# Patient Record
Sex: FEMALE | Race: WHITE | Employment: OTHER | URBAN - METROPOLITAN AREA
[De-identification: names, ages, dates, MRNs, and addresses within clinical notes are randomized per-mention and may not be internally consistent; named-entity substitution may affect disease eponyms.]

---

## 2023-06-21 ENCOUNTER — HOSPITAL ENCOUNTER (INPATIENT)
Age: 68
LOS: 1 days | Discharge: HOME | DRG: 087 | End: 2023-06-22
Attending: STUDENT IN AN ORGANIZED HEALTH CARE EDUCATION/TRAINING PROGRAM | Admitting: STUDENT IN AN ORGANIZED HEALTH CARE EDUCATION/TRAINING PROGRAM
Payer: MEDICARE

## 2023-06-21 ENCOUNTER — APPOINTMENT (OUTPATIENT)
Dept: GENERAL RADIOLOGY | Age: 68
DRG: 087 | End: 2023-06-21
Payer: MEDICARE

## 2023-06-21 ENCOUNTER — APPOINTMENT (OUTPATIENT)
Dept: CT IMAGING | Age: 68
DRG: 087 | End: 2023-06-21
Payer: MEDICARE

## 2023-06-21 DIAGNOSIS — S09.90XA INJURY OF HEAD, INITIAL ENCOUNTER: ICD-10-CM
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
## 2023-06-21 DIAGNOSIS — S62.92XA CLOSED FRACTURE OF LEFT HAND, INITIAL ENCOUNTER: ICD-10-CM

## 2023-06-21 DIAGNOSIS — G93.9 BRAIN LESION: Primary | ICD-10-CM

## 2023-06-21 PROBLEM — S06.5XAA SUBDURAL HEMATOMA, ACUTE (HCC): Status: ACTIVE | Noted: 2023-06-21

## 2023-06-21 PROBLEM — I60.9 SUBARACHNOID BLEED (HCC): Status: ACTIVE | Noted: 2023-06-21

## 2023-06-21 LAB
ALBUMIN SERPL-MCNC: 4.3 G/DL (ref 3.5–4.6)
ALP SERPL-CCNC: 95 U/L (ref 40–130)
ALT SERPL-CCNC: 17 U/L (ref 0–33)
ANION GAP SERPL CALCULATED.3IONS-SCNC: 14 MEQ/L (ref 9–15)
APTT PPP: 27.9 SEC (ref 24.4–36.8)
AST SERPL-CCNC: 22 U/L (ref 0–35)
BASOPHILS # BLD: 0.1 K/UL (ref 0–0.2)
BASOPHILS NFR BLD: 0.9 %
BILIRUB SERPL-MCNC: 0.3 MG/DL (ref 0.2–0.7)
BUN SERPL-MCNC: 21 MG/DL (ref 8–23)
CALCIUM SERPL-MCNC: 9.3 MG/DL (ref 8.5–9.9)
CHLORIDE SERPL-SCNC: 101 MEQ/L (ref 95–107)
CO2 SERPL-SCNC: 28 MEQ/L (ref 20–31)
CREAT SERPL-MCNC: 1.01 MG/DL (ref 0.5–0.9)
EOSINOPHIL # BLD: 0.1 K/UL (ref 0–0.7)
EOSINOPHIL NFR BLD: 1.8 %
ERYTHROCYTE [DISTWIDTH] IN BLOOD BY AUTOMATED COUNT: 14.8 % (ref 11.5–14.5)
ETHANOL PERCENT: NORMAL G/DL
ETHANOLAMINE SERPL-MCNC: <10 MG/DL (ref 0–0.08)
GLOBULIN SER CALC-MCNC: 2.4 G/DL (ref 2.3–3.5)
GLUCOSE SERPL-MCNC: 111 MG/DL (ref 70–99)
HCT VFR BLD AUTO: 41.4 % (ref 37–47)
HGB BLD-MCNC: 13.9 G/DL (ref 12–16)
INR PPP: 1
LYMPHOCYTES # BLD: 2 K/UL (ref 1–4.8)
LYMPHOCYTES NFR BLD: 25.5 %
MCH RBC QN AUTO: 29.4 PG (ref 27–31.3)
MCHC RBC AUTO-ENTMCNC: 33.7 % (ref 33–37)
MCV RBC AUTO: 87.4 FL (ref 79.4–94.8)
MONOCYTES # BLD: 0.5 K/UL (ref 0.2–0.8)
MONOCYTES NFR BLD: 5.9 %
NEUTROPHILS # BLD: 5.1 K/UL (ref 1.4–6.5)
NEUTS SEG NFR BLD: 65.9 %
PLATELET # BLD AUTO: 269 K/UL (ref 130–400)
POTASSIUM SERPL-SCNC: 3.6 MEQ/L (ref 3.4–4.9)
PROT SERPL-MCNC: 6.7 G/DL (ref 6.3–8)
PROTHROMBIN TIME: 13.3 SEC (ref 12.3–14.9)
RBC # BLD AUTO: 4.73 M/UL (ref 4.2–5.4)
SODIUM SERPL-SCNC: 143 MEQ/L (ref 135–144)
WBC # BLD AUTO: 7.7 K/UL (ref 4.8–10.8)

## 2023-06-21 PROCEDURE — 6360000002 HC RX W HCPCS: Performed by: PHYSICIAN ASSISTANT

## 2023-06-21 PROCEDURE — 99285 EMERGENCY DEPT VISIT HI MDM: CPT

## 2023-06-21 PROCEDURE — 85730 THROMBOPLASTIN TIME PARTIAL: CPT

## 2023-06-21 PROCEDURE — 1210000000 HC MED SURG R&B

## 2023-06-21 PROCEDURE — 99222 1ST HOSP IP/OBS MODERATE 55: CPT | Performed by: NEUROLOGICAL SURGERY

## 2023-06-21 PROCEDURE — 6370000000 HC RX 637 (ALT 250 FOR IP): Performed by: STUDENT IN AN ORGANIZED HEALTH CARE EDUCATION/TRAINING PROGRAM

## 2023-06-21 PROCEDURE — 36415 COLL VENOUS BLD VENIPUNCTURE: CPT

## 2023-06-21 PROCEDURE — 72125 CT NECK SPINE W/O DYE: CPT

## 2023-06-21 PROCEDURE — 85610 PROTHROMBIN TIME: CPT

## 2023-06-21 PROCEDURE — 2580000003 HC RX 258: Performed by: STUDENT IN AN ORGANIZED HEALTH CARE EDUCATION/TRAINING PROGRAM

## 2023-06-21 PROCEDURE — 73130 X-RAY EXAM OF HAND: CPT

## 2023-06-21 PROCEDURE — 70450 CT HEAD/BRAIN W/O DYE: CPT

## 2023-06-21 PROCEDURE — 85025 COMPLETE CBC W/AUTO DIFF WBC: CPT

## 2023-06-21 PROCEDURE — 82077 ASSAY SPEC XCP UR&BREATH IA: CPT

## 2023-06-21 PROCEDURE — 80053 COMPREHEN METABOLIC PANEL: CPT

## 2023-06-21 RX ORDER — SODIUM CHLORIDE 9 MG/ML
INJECTION, SOLUTION INTRAVENOUS PRN
Status: DISCONTINUED | OUTPATIENT
Start: 2023-06-21 | End: 2023-06-22 | Stop reason: HOSPADM

## 2023-06-21 RX ORDER — MORPHINE SULFATE 4 MG/ML
4 INJECTION, SOLUTION INTRAMUSCULAR; INTRAVENOUS ONCE
Status: COMPLETED | OUTPATIENT
Start: 2023-06-21 | End: 2023-06-21

## 2023-06-21 RX ORDER — ONDANSETRON 4 MG/1
4 TABLET, ORALLY DISINTEGRATING ORAL EVERY 8 HOURS PRN
Status: DISCONTINUED | OUTPATIENT
Start: 2023-06-21 | End: 2023-06-22 | Stop reason: HOSPADM

## 2023-06-21 RX ORDER — SODIUM CHLORIDE 0.9 % (FLUSH) 0.9 %
5-40 SYRINGE (ML) INJECTION EVERY 12 HOURS SCHEDULED
Status: DISCONTINUED | OUTPATIENT
Start: 2023-06-21 | End: 2023-06-22 | Stop reason: HOSPADM

## 2023-06-21 RX ORDER — OXYCODONE HYDROCHLORIDE 5 MG/1
5 CAPSULE ORAL EVERY 4 HOURS PRN
Status: DISCONTINUED | OUTPATIENT
Start: 2023-06-21 | End: 2023-06-22

## 2023-06-21 RX ORDER — ONDANSETRON 2 MG/ML
4 INJECTION INTRAMUSCULAR; INTRAVENOUS ONCE
Status: COMPLETED | OUTPATIENT
Start: 2023-06-21 | End: 2023-06-21

## 2023-06-21 RX ORDER — POLYETHYLENE GLYCOL 3350 17 G/17G
17 POWDER, FOR SOLUTION ORAL DAILY
Status: DISCONTINUED | OUTPATIENT
Start: 2023-06-22 | End: 2023-06-22 | Stop reason: HOSPADM

## 2023-06-21 RX ORDER — SODIUM CHLORIDE 0.9 % (FLUSH) 0.9 %
5-40 SYRINGE (ML) INJECTION PRN
Status: DISCONTINUED | OUTPATIENT
Start: 2023-06-21 | End: 2023-06-22 | Stop reason: HOSPADM

## 2023-06-21 RX ORDER — ACETAMINOPHEN 325 MG/1
650 TABLET ORAL EVERY 6 HOURS
Status: DISCONTINUED | OUTPATIENT
Start: 2023-06-21 | End: 2023-06-22 | Stop reason: HOSPADM

## 2023-06-21 RX ORDER — ONDANSETRON 2 MG/ML
4 INJECTION INTRAMUSCULAR; INTRAVENOUS EVERY 6 HOURS PRN
Status: DISCONTINUED | OUTPATIENT
Start: 2023-06-21 | End: 2023-06-22 | Stop reason: HOSPADM

## 2023-06-21 RX ORDER — BISACODYL 5 MG/1
5 TABLET, DELAYED RELEASE ORAL DAILY
Status: DISCONTINUED | OUTPATIENT
Start: 2023-06-22 | End: 2023-06-22 | Stop reason: HOSPADM

## 2023-06-21 RX ADMIN — MORPHINE SULFATE 4 MG: 4 INJECTION, SOLUTION INTRAMUSCULAR; INTRAVENOUS at 21:40

## 2023-06-21 RX ADMIN — ACETAMINOPHEN 650 MG: 325 TABLET ORAL at 21:39

## 2023-06-21 RX ADMIN — SODIUM CHLORIDE, PRESERVATIVE FREE 10 ML: 5 INJECTION INTRAVENOUS at 23:59

## 2023-06-21 RX ADMIN — ONDANSETRON 4 MG: 2 INJECTION INTRAMUSCULAR; INTRAVENOUS at 21:40

## 2023-06-21 ASSESSMENT — PAIN DESCRIPTION - LOCATION
LOCATION: HAND
LOCATION: HAND

## 2023-06-21 ASSESSMENT — ENCOUNTER SYMPTOMS
COUGH: 0
NAUSEA: 0
VOMITING: 0
ABDOMINAL DISTENTION: 0
APNEA: 0
EYE PAIN: 0
ANAL BLEEDING: 0
EYE DISCHARGE: 0
SHORTNESS OF BREATH: 0
BACK PAIN: 0
VOICE CHANGE: 0

## 2023-06-21 ASSESSMENT — PAIN DESCRIPTION - ORIENTATION
ORIENTATION: LEFT
ORIENTATION: RIGHT

## 2023-06-21 ASSESSMENT — LIFESTYLE VARIABLES
HOW MANY STANDARD DRINKS CONTAINING ALCOHOL DO YOU HAVE ON A TYPICAL DAY: PATIENT DOES NOT DRINK
HOW OFTEN DO YOU HAVE A DRINK CONTAINING ALCOHOL: NEVER

## 2023-06-21 ASSESSMENT — PAIN SCALES - GENERAL
PAINLEVEL_OUTOF10: 7
PAINLEVEL_OUTOF10: 8

## 2023-06-21 ASSESSMENT — PAIN - FUNCTIONAL ASSESSMENT: PAIN_FUNCTIONAL_ASSESSMENT: 0-10

## 2023-06-21 NOTE — ED TRIAGE NOTES
Pt arrived to triage via private vehicle. Pt c/o left hand pain. Pt states she was washing windows when she lost her footing causing her to trip and fall. Pt states she hit her head. Pt states she did lose consciousness. Pt denies any blood thinners.

## 2023-06-21 NOTE — ED PROVIDER NOTES
1 [facility] ,[address]  eMERGENCY dEPARTMENT eNCOUnter      Pt Name: Desiree Hatchet  MRN: [id number]  Desireegfwhitney 1955  Date of evaluation: 6/21/2023  Provider: Jose Akhtar Dr       Chief Complaint   Patient presents with    Fall     Left hand injury, No blood thinners (+) LOC         HISTORY OF PRESENT ILLNESS   (Location/Symptom, Timing/Onset,Context/Setting, Quality, Duration, Modifying Factors, Severity)  Note limiting factors. Desiree Hatchet is a 77 y.o. female who presents to the emergency department patient fell and garden states she tripped over a bush while washing her windows shows a left hand injury. She notes that she does have some left-sided neck pain and did have a positive LOC states that she does have syncopal episodes which been going on since childhood denies any headache difficulty seeing hearing speaking chest pain abdominal pain denies pain burning frequent urination urinary bleeding rectal bleeding. Does have tenderness overlying left hand laterally and points left side of neck. Symptoms mild to moderate severity worse with touch motion nothing proves symptoms    HPI    NursingNotes were reviewed. REVIEW OF SYSTEMS    (2-9 systems for level 4, 10 or more for level 5)     Review of Systems   Constitutional:  Negative for activity change, appetite change, fever and unexpected weight change. HENT:  Negative for ear discharge, nosebleeds and voice change. Eyes:  Negative for discharge. Respiratory:  Negative for apnea and cough. Cardiovascular:  Negative for chest pain. Gastrointestinal:  Negative for abdominal distention, anal bleeding, nausea and vomiting. Endocrine: Negative for polyuria. Genitourinary:  Negative for dysuria and hematuria. Musculoskeletal:  Positive for arthralgias and neck pain. Negative for back pain, joint swelling and neck stiffness. Skin:  Negative for pallor.    Neurological:  Negative for seizures, facial

## 2023-06-22 ENCOUNTER — APPOINTMENT (OUTPATIENT)
Dept: MRI IMAGING | Age: 68
DRG: 087 | End: 2023-06-22
Payer: MEDICARE

## 2023-06-22 VITALS
OXYGEN SATURATION: 93 % | DIASTOLIC BLOOD PRESSURE: 59 MMHG | SYSTOLIC BLOOD PRESSURE: 116 MMHG | HEIGHT: 69 IN | BODY MASS INDEX: 39.84 KG/M2 | HEART RATE: 57 BPM | RESPIRATION RATE: 16 BRPM | WEIGHT: 269 LBS | TEMPERATURE: 97.3 F

## 2023-06-22 DIAGNOSIS — S06.5XAA SUBDURAL HEMATOMA, ACUTE (HCC): Primary | ICD-10-CM

## 2023-06-22 DIAGNOSIS — S62.347A CLOSED NONDISPLACED FRACTURE OF BASE OF FIFTH METACARPAL BONE OF LEFT HAND, INITIAL ENCOUNTER: Primary | ICD-10-CM

## 2023-06-22 PROBLEM — D18.02 HEMANGIOMA OF INTRACRANIAL STRUCTURE (HCC): Status: ACTIVE | Noted: 2023-06-22

## 2023-06-22 PROCEDURE — 6370000000 HC RX 637 (ALT 250 FOR IP): Performed by: STUDENT IN AN ORGANIZED HEALTH CARE EDUCATION/TRAINING PROGRAM

## 2023-06-22 PROCEDURE — 70553 MRI BRAIN STEM W/O & W/DYE: CPT

## 2023-06-22 PROCEDURE — 2580000003 HC RX 258: Performed by: STUDENT IN AN ORGANIZED HEALTH CARE EDUCATION/TRAINING PROGRAM

## 2023-06-22 PROCEDURE — 6360000004 HC RX CONTRAST MEDICATION: Performed by: NEUROLOGICAL SURGERY

## 2023-06-22 PROCEDURE — A9579 GAD-BASE MR CONTRAST NOS,1ML: HCPCS | Performed by: NEUROLOGICAL SURGERY

## 2023-06-22 PROCEDURE — 99232 SBSQ HOSP IP/OBS MODERATE 35: CPT | Performed by: NEUROLOGICAL SURGERY

## 2023-06-22 PROCEDURE — 6370000000 HC RX 637 (ALT 250 FOR IP): Performed by: PHYSICIAN ASSISTANT

## 2023-06-22 RX ORDER — ERGOCALCIFEROL 1.25 MG/1
CAPSULE ORAL
COMMUNITY
Start: 2022-07-14

## 2023-06-22 RX ORDER — OXYCODONE HYDROCHLORIDE 5 MG/1
5 CAPSULE ORAL EVERY 4 HOURS PRN
Status: DISCONTINUED | OUTPATIENT
Start: 2023-06-22 | End: 2023-06-22 | Stop reason: HOSPADM

## 2023-06-22 RX ORDER — POTASSIUM CHLORIDE 20 MEQ/1
20 TABLET, EXTENDED RELEASE ORAL DAILY
Status: DISCONTINUED | OUTPATIENT
Start: 2023-06-23 | End: 2023-06-22 | Stop reason: HOSPADM

## 2023-06-22 RX ORDER — POTASSIUM CHLORIDE 750 MG/1
20 TABLET, EXTENDED RELEASE ORAL DAILY
COMMUNITY
Start: 2023-05-23

## 2023-06-22 RX ORDER — METOPROLOL SUCCINATE 50 MG/1
50 TABLET, EXTENDED RELEASE ORAL DAILY
COMMUNITY
Start: 2023-05-26

## 2023-06-22 RX ORDER — OXYCODONE HYDROCHLORIDE 5 MG/1
2.5 TABLET ORAL EVERY 4 HOURS PRN
Status: DISCONTINUED | OUTPATIENT
Start: 2023-06-22 | End: 2023-06-22 | Stop reason: HOSPADM

## 2023-06-22 RX ORDER — METOPROLOL SUCCINATE 50 MG/1
50 TABLET, EXTENDED RELEASE ORAL DAILY
Status: DISCONTINUED | OUTPATIENT
Start: 2023-06-22 | End: 2023-06-22 | Stop reason: HOSPADM

## 2023-06-22 RX ORDER — CHLORTHALIDONE 25 MG/1
25 TABLET ORAL DAILY
COMMUNITY
Start: 2023-03-17

## 2023-06-22 RX ORDER — LOSARTAN POTASSIUM 100 MG/1
100 TABLET ORAL DAILY
COMMUNITY
Start: 2023-05-05

## 2023-06-22 RX ORDER — ATORVASTATIN CALCIUM 20 MG/1
20 TABLET, FILM COATED ORAL DAILY
COMMUNITY
Start: 2023-04-30

## 2023-06-22 RX ORDER — CHLORTHALIDONE 25 MG/1
25 TABLET ORAL DAILY
Status: DISCONTINUED | OUTPATIENT
Start: 2023-06-23 | End: 2023-06-22 | Stop reason: HOSPADM

## 2023-06-22 RX ORDER — LOSARTAN POTASSIUM 25 MG/1
100 TABLET ORAL DAILY
Status: DISCONTINUED | OUTPATIENT
Start: 2023-06-22 | End: 2023-06-22 | Stop reason: HOSPADM

## 2023-06-22 RX ORDER — VERAPAMIL HYDROCHLORIDE 240 MG/1
240 TABLET, FILM COATED, EXTENDED RELEASE ORAL 2 TIMES DAILY
Status: DISCONTINUED | OUTPATIENT
Start: 2023-06-22 | End: 2023-06-22 | Stop reason: HOSPADM

## 2023-06-22 RX ORDER — ATORVASTATIN CALCIUM 20 MG/1
20 TABLET, FILM COATED ORAL NIGHTLY
Status: DISCONTINUED | OUTPATIENT
Start: 2023-06-22 | End: 2023-06-22 | Stop reason: HOSPADM

## 2023-06-22 RX ORDER — ACETAMINOPHEN 500 MG
1000 TABLET ORAL EVERY 8 HOURS PRN
COMMUNITY
Start: 2019-06-28

## 2023-06-22 RX ORDER — MELOXICAM 15 MG/1
15 TABLET ORAL DAILY
COMMUNITY
Start: 2023-04-18

## 2023-06-22 RX ORDER — BACLOFEN 20 MG
500 TABLET ORAL DAILY
COMMUNITY
Start: 2022-02-11

## 2023-06-22 RX ADMIN — SODIUM CHLORIDE, PRESERVATIVE FREE 10 ML: 5 INJECTION INTRAVENOUS at 09:45

## 2023-06-22 RX ADMIN — GADOTERIDOL 20 ML: 279.3 INJECTION, SOLUTION INTRAVENOUS at 12:19

## 2023-06-22 RX ADMIN — OXYCODONE HYDROCHLORIDE 5 MG: 5 CAPSULE ORAL at 01:28

## 2023-06-22 RX ADMIN — LOSARTAN POTASSIUM 100 MG: 25 TABLET, FILM COATED ORAL at 10:18

## 2023-06-22 RX ADMIN — ACETAMINOPHEN 650 MG: 325 TABLET ORAL at 03:45

## 2023-06-22 RX ADMIN — ACETAMINOPHEN 650 MG: 325 TABLET ORAL at 09:41

## 2023-06-22 RX ADMIN — BISACODYL 5 MG: 5 TABLET, COATED ORAL at 09:41

## 2023-06-22 RX ADMIN — VERAPAMIL HYDROCHLORIDE 240 MG: 240 TABLET, FILM COATED, EXTENDED RELEASE ORAL at 10:18

## 2023-06-22 RX ADMIN — METOPROLOL SUCCINATE 50 MG: 50 TABLET, EXTENDED RELEASE ORAL at 10:18

## 2023-06-22 RX ADMIN — OXYCODONE HYDROCHLORIDE 5 MG: 5 CAPSULE ORAL at 09:47

## 2023-06-22 ASSESSMENT — PAIN DESCRIPTION - ORIENTATION
ORIENTATION: LEFT
ORIENTATION: LEFT

## 2023-06-22 ASSESSMENT — PAIN DESCRIPTION - LOCATION
LOCATION: WRIST
LOCATION: HAND

## 2023-06-22 ASSESSMENT — PAIN SCALES - GENERAL
PAINLEVEL_OUTOF10: 3
PAINLEVEL_OUTOF10: 3
PAINLEVEL_OUTOF10: 8
PAINLEVEL_OUTOF10: 8

## 2023-06-22 ASSESSMENT — PAIN DESCRIPTION - DESCRIPTORS
DESCRIPTORS: DISCOMFORT
DESCRIPTORS: ACHING;NUMBNESS

## 2023-06-22 NOTE — PROGRESS NOTES
[place] [address] [facility]    REASON FOR CONSULT: wrist fracture, left    PROVIDERS:  Dr. Sissy Almeida, PAMattC    Subjective:   HPI: Oscar Ervin is a 77 y.o. female was consulted for a base left metatarsal fracture without any displacement. She was splinted in the emergency department. She is complaining of pain and swelling in the area. She was admitted for concerns of bleeding breed. Hoping to go home this afternoon. No past medical history on file. No past surgical history on file. Social History     Socioeconomic History    Marital status:      Spouse name: Not on file    Number of children: Not on file    Years of education: Not on file    Highest education level: Not on file   Occupational History    Not on file   Tobacco Use    Smoking status: Not on file    Smokeless tobacco: Not on file   Substance and Sexual Activity    Alcohol use: Not on file    Drug use: Not on file    Sexual activity: Not on file   Other Topics Concern    Not on file   Social History Narrative    Not on file     Social Determinants of Health     Financial Resource Strain: Not on file   Food Insecurity: Not on file   Transportation Needs: Not on file   Physical Activity: Not on file   Stress: Not on file   Social Connections: Not on file   Intimate Partner Violence: Not on file   Housing Stability: Not on file     No family history on file. Allergies   Allergen Reactions    Adhesive Tape Hives    Sulfa Antibiotics Nausea And Vomiting     No current facility-administered medications on file prior to encounter.      Current Outpatient Medications on File Prior to Encounter   Medication Sig Dispense Refill    acetaminophen (TYLENOL) 500 MG tablet Take 2 tablets by mouth every 8 hours as needed      magnesium Oxide 500 MG TABS Take 500 mg by mouth daily      ergocalciferol (ERGOCALCIFEROL) 1.25 MG (06339 UT) capsule take 1 capsule once a week      atorvastatin (LIPITOR) 20 MG tablet

## 2023-06-22 NOTE — PROGRESS NOTES
1550: Patient discharged home via private car with her . Wheelchair to main entrance. Patient in no distress on discharge. Medications, discharge instructions, and follow up discussed. JASSON encouraged at home. Patient and her  agree to discharge plan, deny questions.

## 2023-06-22 NOTE — PROGRESS NOTES
Progress Note  Patient: Vivek Guzmán  Unit/Bed: J365/R057-43  YOB: 1955  MRN: [id number]  Acct: [de-identified]   Admitting Diagnosis: Subarachnoid bleed ([address] [I60.9]  Date:  6/21/2023  Hospital Day: 1    Chief Complaint: Left hand pain        Physical Examination:    BP (!) 140/53   Pulse 59   Temp 98.3 °F (36.8 °C) (Oral)   Resp 18   Ht 5' 9\" (1.753 m)   Wt 269 lb (122 kg)   SpO2 99%   BMI 39.72 kg/m²    Physical Exam left hand stabilized for her fracture.   She is awake alert no neurologic complaints    LABS:  CBC:   Lab Results   Component Value Date/Time    WBC 7.7 06/21/2023 08:45 PM    RBC 4.73 06/21/2023 08:45 PM    HGB 13.9 06/21/2023 08:45 PM    HCT 41.4 06/21/2023 08:45 PM    MCV 87.4 06/21/2023 08:45 PM    MCH 29.4 06/21/2023 08:45 PM    MCHC 33.7 06/21/2023 08:45 PM    RDW 14.8 06/21/2023 08:45 PM     06/21/2023 08:45 PM     CBC with Differential:   Lab Results   Component Value Date/Time    WBC 7.7 06/21/2023 08:45 PM    RBC 4.73 06/21/2023 08:45 PM    HGB 13.9 06/21/2023 08:45 PM    HCT 41.4 06/21/2023 08:45 PM     06/21/2023 08:45 PM    MCV 87.4 06/21/2023 08:45 PM    MCH 29.4 06/21/2023 08:45 PM    MCHC 33.7 06/21/2023 08:45 PM    RDW 14.8 06/21/2023 08:45 PM    LYMPHOPCT 25.5 06/21/2023 08:45 PM    MONOPCT 5.9 06/21/2023 08:45 PM    BASOPCT 0.9 06/21/2023 08:45 PM    MONOSABS 0.5 06/21/2023 08:45 PM    LYMPHSABS 2.0 06/21/2023 08:45 PM    EOSABS 0.1 06/21/2023 08:45 PM    BASOSABS 0.1 06/21/2023 08:45 PM     CMP:    Lab Results   Component Value Date/Time     06/21/2023 08:45 PM    K 3.6 06/21/2023 08:45 PM     06/21/2023 08:45 PM    CO2 28 06/21/2023 08:45 PM    BUN 21 06/21/2023 08:45 PM    CREATININE 1.01 06/21/2023 08:45 PM    GFRAA >60 03/25/2014 07:35 AM    LABGLOM 59.8 06/21/2023 08:45 PM    GLUCOSE 111 06/21/2023 08:45 PM    PROT 6.7 06/21/2023 08:45 PM    LABALBU 4.3 06/21/2023 08:45 PM    CALCIUM 9.3 06/21/2023 08:45 PM    BILITOT 0.3

## 2023-06-22 NOTE — DISCHARGE SUMMARY
[address]  [address]  [place], [address]ait[address] [facility] Brenda Colon  MRN: [id number]  YOB: 1955  77 y. o.female      Attending  No att. providers found ? Date of Admission  6/21/2023 Date of Discharge  6/22/2023      ? DIAGNOSES:  Active Problems:    Closed nondisplaced fracture of base of fifth metacarpal bone of left hand    Hemangioma of intracranial structure ([address]  Resolved Problems:    * No resolved hospital problems. *       PROCEDURES: None      DISCHARGE MEDICATIONS:  Discharge Medication List as of 6/22/2023  3:35 PM             Details   acetaminophen (TYLENOL) 500 MG tablet Take 2 tablets by mouth every 8 hours as neededHistorical Med      atorvastatin (LIPITOR) 20 MG tablet Take 1 tablet by mouth dailyHistorical Med      losartan (COZAAR) 100 MG tablet Take 1 tablet by mouth dailyHistorical Med      magnesium Oxide 500 MG TABS Take 500 mg by mouth dailyHistorical Med      meloxicam (MOBIC) 15 MG tablet Take 1 tablet by mouth daily with foodHistorical Med      metoprolol succinate (TOPROL XL) 50 MG extended release tablet Take 1 tablet by mouth dailyHistorical Med      potassium chloride (KLOR-CON M) 10 MEQ extended release tablet Take 2 tablets by mouth dailyHistorical Med      verapamil (CALAN SR) 120 MG extended release tablet Take 2 tablets by mouth 2 times dailyHistorical Med      ergocalciferol (ERGOCALCIFEROL) 1.25 MG (67760 UT) capsule take 1 capsule once a weekHistorical Med      cyanocobalamin 1000 MCG tablet Take 1 tablet by mouth dailyHistorical Med      chlorthalidone (HYGROTON) 25 MG tablet Take 1 tablet by mouth dailyHistorical Med              REASON FOR HOSPITALIZATION:  Brenda Colon is a 77 y.o. female who presented to [facility] ED on 6/21/23 for evaluation after sustaining a fall. (+) head strike, (+) LOC, (-) AC/AP. Patient was found to have a SDH vs hemangioma on CT imaging and left 5th metacarpal fracture.  She was subsequently

## 2023-06-22 NOTE — CONSULTS
Patient Name: Shella Goltz Date: 2023  6:04 PM  MR #: [id number]  : 1955    Attending Physician: No att. providers found  Reason for consult: Subdural hematoma    History of Presenting Illness and Review of Systems     Selvin Barkley is a 77 y.o. female on hospital day 0 . History Of Present Illness:  2023 admitted through the emergency room when she lost her footing while washing windows causing her to trip and fall striking her head with loss of consciousness less than 30 minutes. There is a history of syncopal episodes ever since she was young. History:      No past medical history on file. No past surgical history on file. Family History  No family history on file. [] Unable to obtain due to ventilated and/ or neurologic status    Social History     Socioeconomic History    Marital status:      Spouse name: Not on file    Number of children: Not on file    Years of education: Not on file    Highest education level: Not on file   Occupational History    Not on file   Tobacco Use    Smoking status: Not on file    Smokeless tobacco: Not on file   Substance and Sexual Activity    Alcohol use: Not on file    Drug use: Not on file    Sexual activity: Not on file   Other Topics Concern    Not on file   Social History Narrative    Not on file     Social Determinants of Health     Financial Resource Strain: Not on file   Food Insecurity: Not on file   Transportation Needs: Not on file   Physical Activity: Not on file   Stress: Not on file   Social Connections: Not on file   Intimate Partner Violence: Not on file   Housing Stability: Not on file      [] Unable to obtain due to ventilated and/ or neurologic status    Home Medications:      Not in a hospital admission. Current Hospital Medications:     Scheduled Meds:  Continuous Infusions:  PRN Meds:. .       Allergies:      Allergies   Allergen Reactions    Adhesive Tape Hives    Sulfa Antibiotics Nausea And Vomiting

## 2023-06-22 NOTE — CARE COORDINATION
Case Management Assessment  Initial Evaluation    Date/Time of Evaluation: 6/22/2023 11:59 AM  Assessment Completed by: Nelia Burger RN    If patient is discharged prior to next notation, then this note serves as note for discharge by case management. Patient Name: Jose Alberto Pickering                   YOB: 1955  Diagnosis: Subarachnoid bleed [facility]) [I60.9]                   Date / Time: 6/21/2023  6:04 PM    Patient Admission Status: Inpatient   Readmission Risk (Low < 19, Mod (19-27), High > 27): Readmission Risk Score: 6.6    Current PCP: Annia Varela MD  PCP verified by CM? Yes    Chart Reviewed: Yes      History Provided by: Significant Other  Patient Orientation: Alert and Oriented    Patient Cognition: Alert    Hospitalization in the last 30 days (Readmission):  No    If yes, Readmission Assessment in CM Navigator will be completed. Advance Directives:      Code Status: Full Code   Patient's Primary Decision Maker is:        Discharge Planning:    Patient lives with: Spouse/Significant Other Type of Home: House  Primary Care Giver: Self  Patient Support Systems include: Spouse/Significant Other   Current Financial resources:    Current community resources:    Current services prior to admission: None            Current DME:              Type of Home Care services:  None    ADLS  Prior functional level: Independent in ADLs/IADLs  Current functional level: Independent in ADLs/IADLs    PT AM-PAC:   /24  OT AM-PAC:   /24    Family can provide assistance at DC: Yes  Would you like Case Management to discuss the discharge plan with any other family members/significant others, and if so, who?  Yes (DICUSSED W/ )  Plans to Return to Present Housing: Yes  Other Identified Issues/Barriers to RETURNING to current housing: NA  Potential Assistance needed at discharge: N/A            Potential DME:    Patient expects to discharge to: [address] [place] [address] for transportation at discharge:

## 2023-06-22 NOTE — DISCHARGE INSTRUCTIONS
Obtain CT scan head at [facility] few days prior to recheck appointment. Order done as outpatient. Recheck one month. Questions call office [phone]. Orthopedics / Hand Fracture:  Wear your splint at all times and keep dry during periods of hygiene purposes. Follow-up in the office with you on Monday at 930 in the medical office building, [address]. I do not recommend any anti-inflammatory medication or aspirin use for your pain as there is concern of brain bleeding. Icing as frequently as you feel necessary at top of your splint. Do not push pull or lift anything heavier than 2 pounds with your injured hand.   If you have any questions please call our office at [phone]

## 2023-06-22 NOTE — CARE COORDINATION
Advance Care Planning   Healthcare Decision Maker:  NEXT OF Josemonty Joshi [phone]    Click here to complete Healthcare Decision Makers including selection of the Healthcare Decision Maker Relationship (ie \"Primary\").

## 2023-06-22 NOTE — H&P
Trauma Consult / H & P Note    Reason for Consult: Trauma  Consulting Provider: Nneka Hunt PA-C      BASIC INJURY INFORMATION:  Level of activation: CAT 2  Mode of transport: Unknown  Mechanism of injury: Fall from height  Complicating features: NA  Protective measures: NA    HISTORY OF PRESENT INJURY:   Benito Gilbert is a 77 y.o. female who presented to [facility] EMERGENCY [facility] [place] ED on 6/21/23 for evaluation after sustaining a fall. (+) head strike, (+) LOC, (-) AC/AP. Patient was found to have a SDH vs hemangioma on CT imaging and left 5th metacarpal fracture. She was subsequently admitted to our trauma service for formal neurosurgery evaluation and neuro checks. This morning patient reports continued left hand pain. No new injuries reported. PRIMARY SURVEY:  Airway: Intact  Breathing: Normal   Breath Sounds: Breath Sounds Equal Bilaterally  Circulation:    Pulses: Normal   Skin: Normal skin color, texture and turgor and No rashes or lesions  Disability:   Pupils: PERRL   GCS:    Best Eyes: 4    Best Verbal: 5    Best Motor: 6    Total: 15    Vitals:   Vitals:    06/21/23 2308 06/22/23 0117 06/22/23 0739 06/22/23 0947   BP: (!) 140/79 (!) 140/53 (!) 150/67    Pulse: 53 59 52    Resp:   18 16   Temp: 97.7 °F (36.5 °C) 98.3 °F (36.8 °C) 97.3 °F (36.3 °C)    TempSrc:  Oral Oral    SpO2: 99% 99% 95%    Weight:       Height:             SECONDARY SURVEY:  Neurologic: Alert and Oriented, Appropriate, Moves all Extremities, Strength Symmetrical, and No Sensory Deficits   HEENT:   Head: No lacerations, bony step-offs, or abrasions and Midface stable to palpation   Eyes: PERRL, Corneas/Conjunctiva without lesions and EOM intact   Ears: Not Examined   Nose: Septum Midline, No crepitus with motion; and No bloody discharge;    Throat: Oral cavity without trauma   Neck: No midline tenderness and No lacerations/wounds  Pulmonary: External exam: no crepitus or pain with palpation, no contusions or abrasions; and Lung exam:

## 2023-06-26 ENCOUNTER — OFFICE VISIT (OUTPATIENT)
Dept: ORTHOPEDIC SURGERY | Age: 68
End: 2023-06-26

## 2023-06-26 VITALS
OXYGEN SATURATION: 96 % | HEART RATE: 54 BPM | BODY MASS INDEX: 39.84 KG/M2 | TEMPERATURE: 97.4 F | HEIGHT: 69 IN | WEIGHT: 269 LBS

## 2023-06-26 DIAGNOSIS — S62.347A CLOSED NONDISPLACED FRACTURE OF BASE OF FIFTH METACARPAL BONE OF LEFT HAND, INITIAL ENCOUNTER: Primary | ICD-10-CM

## 2023-06-28 ENCOUNTER — TELEPHONE (OUTPATIENT)
Dept: PAIN MANAGEMENT | Age: 68
End: 2023-06-28

## 2023-07-18 ENCOUNTER — OFFICE VISIT (OUTPATIENT)
Dept: ORTHOPEDIC SURGERY | Age: 68
End: 2023-07-18

## 2023-07-18 ENCOUNTER — HOSPITAL ENCOUNTER (OUTPATIENT)
Dept: ORTHOPEDIC SURGERY | Age: 68
Discharge: HOME | End: 2023-07-20
Payer: MEDICARE

## 2023-07-18 VITALS
OXYGEN SATURATION: 96 % | SYSTOLIC BLOOD PRESSURE: 120 MMHG | HEART RATE: 51 BPM | WEIGHT: 253 LBS | HEIGHT: 69 IN | BODY MASS INDEX: 37.47 KG/M2 | DIASTOLIC BLOOD PRESSURE: 66 MMHG | TEMPERATURE: 98 F

## 2023-07-18 DIAGNOSIS — S62.347A CLOSED NONDISPLACED FRACTURE OF BASE OF FIFTH METACARPAL BONE OF LEFT HAND, INITIAL ENCOUNTER: ICD-10-CM

## 2023-07-18 DIAGNOSIS — S62.347A CLOSED NONDISPLACED FRACTURE OF BASE OF FIFTH METACARPAL BONE OF LEFT HAND, INITIAL ENCOUNTER: Primary | ICD-10-CM

## 2023-07-18 PROCEDURE — 73130 X-RAY EXAM OF HAND: CPT

## 2023-07-18 RX ORDER — LOSARTAN POTASSIUM 100 MG/1
100 TABLET ORAL DAILY
COMMUNITY
Start: 2023-05-05 | End: 2023-07-18

## 2023-07-18 RX ORDER — ATORVASTATIN CALCIUM 20 MG/1
20 TABLET, FILM COATED ORAL DAILY
Qty: 30 TABLET | Refills: 2 | COMMUNITY
Start: 2023-05-01 | End: 2023-07-18

## 2023-07-18 RX ORDER — ASCORBIC ACID 500 MG
500 TABLET ORAL 2 TIMES DAILY WITH MEALS
COMMUNITY
Start: 2016-06-27

## 2023-07-18 RX ORDER — MELOXICAM 15 MG/1
15 TABLET ORAL DAILY
Qty: 30 TABLET | Refills: 8 | COMMUNITY
Start: 2023-01-10 | End: 2023-07-18

## 2023-07-18 NOTE — PROGRESS NOTES
[facility] and [facility]      Subjective:      Chief Complaint   Patient presents with    Follow-up     Lt wrist fx       HPI: Michelle Lees is a 77 y.o. female who is here 4 weeks after a left base metacarpal fracture of the fifth digit. She has been in a boxer fracture brace since the injury. Compliant with the brace. She has no pain. She complains of stiffness  Past Medical History:   Diagnosis Date    Anxiety     Depression     Fractures     Hx of tinnitus     Hypertension     Sacroiliitis (HCC)     Secondary renal hyperparathyroidism (HCC)     Synovitis and tenosynovitis       No past surgical history on file. Social History     Socioeconomic History    Marital status:      Spouse name: Not on file    Number of children: Not on file    Years of education: Not on file    Highest education level: Not on file   Occupational History    Not on file   Tobacco Use    Smoking status: Never     Passive exposure: Never    Smokeless tobacco: Never   Vaping Use    Vaping Use: Never used   Substance and Sexual Activity    Alcohol use: Never    Drug use: Never    Sexual activity: Not on file   Other Topics Concern    Not on file   Social History Narrative    Not on file     Social Determinants of Health     Financial Resource Strain: Not on file   Food Insecurity: Not on file   Transportation Needs: Not on file   Physical Activity: Not on file   Stress: Not on file   Social Connections: Not on file   Intimate Partner Violence: Not on file   Housing Stability: Not on file     No family history on file.   Allergies   Allergen Reactions    Adhesive Tape Hives    Sulfa Antibiotics Nausea And Vomiting     Current Outpatient Medications on File Prior to Visit   Medication Sig Dispense Refill    ascorbic acid (VITAMIN C) 500 MG tablet Take 1 tablet by mouth 2 times daily (with meals)      atorvastatin (LIPITOR) 20 MG tablet Take 1 tablet by mouth daily      losartan (COZAAR) 100 MG tablet

## 2023-08-10 ENCOUNTER — OFFICE VISIT (OUTPATIENT)
Dept: ORTHOPEDIC SURGERY | Age: 68
End: 2023-08-10

## 2023-08-10 ENCOUNTER — HOSPITAL ENCOUNTER (OUTPATIENT)
Dept: ORTHOPEDIC SURGERY | Age: 68
Discharge: HOME | End: 2023-08-12
Payer: MEDICARE

## 2023-08-10 VITALS
SYSTOLIC BLOOD PRESSURE: 116 MMHG | OXYGEN SATURATION: 98 % | HEIGHT: 69 IN | BODY MASS INDEX: 37.36 KG/M2 | DIASTOLIC BLOOD PRESSURE: 72 MMHG | HEART RATE: 60 BPM

## 2023-08-10 DIAGNOSIS — S62.347A CLOSED NONDISPLACED FRACTURE OF BASE OF FIFTH METACARPAL BONE OF LEFT HAND, INITIAL ENCOUNTER: ICD-10-CM

## 2023-08-10 DIAGNOSIS — S62.347A CLOSED NONDISPLACED FRACTURE OF BASE OF FIFTH METACARPAL BONE OF LEFT HAND, INITIAL ENCOUNTER: Primary | ICD-10-CM

## 2023-08-10 PROCEDURE — 73130 X-RAY EXAM OF HAND: CPT

## 2023-08-10 PROCEDURE — 99024 POSTOP FOLLOW-UP VISIT: CPT | Performed by: PHYSICIAN ASSISTANT

## 2023-08-10 NOTE — PROGRESS NOTES
[facility]      Subjective:      Chief Complaint   Patient presents with    Follow-up     3 week follow up- left hand still having swelling and hard to bend ring finger        HPI: Luis A Ness is a 77 y.o. female who is here 8 weeks after a left base metacarpal fracture of the fifth digit. She has been weaning from her brace. There is occasional clicking over the fourth metacarpal.  Pain is subsiding. Past Medical History:   Diagnosis Date    Anxiety     Depression     Fractures     Hx of tinnitus     Hypertension     Mixed hyperlipidemia     Obesity     Ovarian mass     Sacroiliitis (HCC)     Secondary renal hyperparathyroidism (HCC)     Synovitis and tenosynovitis       No past surgical history on file. Social History     Socioeconomic History    Marital status:      Spouse name: Not on file    Number of children: Not on file    Years of education: Not on file    Highest education level: Not on file   Occupational History    Not on file   Tobacco Use    Smoking status: Never     Passive exposure: Never    Smokeless tobacco: Never   Vaping Use    Vaping Use: Never used   Substance and Sexual Activity    Alcohol use: Never    Drug use: Never    Sexual activity: Not on file   Other Topics Concern    Not on file   Social History Narrative    Not on file     Social Determinants of Health     Financial Resource Strain: Not on file   Food Insecurity: Not on file   Transportation Needs: Not on file   Physical Activity: Not on file   Stress: Not on file   Social Connections: Not on file   Intimate Partner Violence: Not on file   Housing Stability: Not on file     No family history on file.   Allergies   Allergen Reactions    Adhesive Tape Hives    Sulfa Antibiotics Nausea And Vomiting     Current Outpatient Medications on File Prior to Visit   Medication Sig Dispense Refill    ascorbic acid (VITAMIN C) 500 MG tablet Take 1 tablet by mouth 2 times daily (with meals)

## 2023-09-20 ENCOUNTER — HOSPITAL ENCOUNTER (OUTPATIENT)
Dept: ORTHOPEDIC SURGERY | Age: 68
Discharge: HOME | End: 2023-09-22
Payer: MEDICARE

## 2023-09-20 ENCOUNTER — OFFICE VISIT (OUTPATIENT)
Dept: ORTHOPEDIC SURGERY | Age: 68
End: 2023-09-20
Payer: MEDICARE

## 2023-09-20 VITALS
TEMPERATURE: 97.6 F | WEIGHT: 253 LBS | OXYGEN SATURATION: 96 % | HEIGHT: 69 IN | BODY MASS INDEX: 37.47 KG/M2 | HEART RATE: 58 BPM

## 2023-09-20 DIAGNOSIS — S62.347A CLOSED NONDISPLACED FRACTURE OF BASE OF FIFTH METACARPAL BONE OF LEFT HAND, INITIAL ENCOUNTER: ICD-10-CM

## 2023-09-20 DIAGNOSIS — S62.347A CLOSED NONDISPLACED FRACTURE OF BASE OF FIFTH METACARPAL BONE OF LEFT HAND, INITIAL ENCOUNTER: Primary | ICD-10-CM

## 2023-09-20 PROCEDURE — 3017F COLORECTAL CA SCREEN DOC REV: CPT | Performed by: PHYSICIAN ASSISTANT

## 2023-09-20 PROCEDURE — 1123F ACP DISCUSS/DSCN MKR DOCD: CPT | Performed by: PHYSICIAN ASSISTANT

## 2023-09-20 PROCEDURE — 1036F TOBACCO NON-USER: CPT | Performed by: PHYSICIAN ASSISTANT

## 2023-09-20 PROCEDURE — 1090F PRES/ABSN URINE INCON ASSESS: CPT | Performed by: PHYSICIAN ASSISTANT

## 2023-09-20 PROCEDURE — 73130 X-RAY EXAM OF HAND: CPT

## 2023-09-20 PROCEDURE — G8400 PT W/DXA NO RESULTS DOC: HCPCS | Performed by: PHYSICIAN ASSISTANT

## 2023-09-20 PROCEDURE — 99213 OFFICE O/P EST LOW 20 MIN: CPT | Performed by: PHYSICIAN ASSISTANT

## 2023-09-20 PROCEDURE — G8417 CALC BMI ABV UP PARAM F/U: HCPCS | Performed by: PHYSICIAN ASSISTANT

## 2023-09-20 PROCEDURE — G8427 DOCREV CUR MEDS BY ELIG CLIN: HCPCS | Performed by: PHYSICIAN ASSISTANT

## 2023-09-20 NOTE — PROGRESS NOTES
\"SEDRATE\"  No results found for: \"CRP\"    Assessment and Plan:      Diagnosis Orders   1. Closed nondisplaced fracture of base of fifth metacarpal bone of left hand, initial encounter  XR HAND LEFT (MIN 3 VIEWS)        About 12 weeks after a minimally displaced fifth metacarpal base fracture. Is been out of the brace, she is working with physical clinic therapy as there has been no available appointments at [facility]. Recommended continuation of therapy as she has deficits with flexion of her digits and also has poor  strength. She is also complaining of numbness and tingling in the bilateral hands, she states that she has carpal tunnel in the past and based on physical and clinical evaluation I do concur with that diagnosis. At this point we want to treat her conservatively with bracing. Once she has her motion and strength back in that hand we will proceed with a possible EMG to diagnose the carpal tunnel to discuss possible carpal tunnel release. See her back at the end of October for repeat evaluation    Orders Placed This Encounter   Procedures    XR HAND LEFT (MIN 3 VIEWS)     Standing Status:   Future     Standing Expiration Date:   9/20/2024     No orders of the defined types were placed in this encounter. No follow-ups on file.     Chary Andres PA-C  [facility] and [facility]  [phone]

## 2024-04-24 ENCOUNTER — OFFICE VISIT (OUTPATIENT)
Dept: PAIN MEDICINE | Facility: CLINIC | Age: 69
End: 2024-04-24
Payer: MEDICARE

## 2024-04-24 DIAGNOSIS — G56.00 CARPAL TUNNEL SYNDROME, UNSPECIFIED LATERALITY: ICD-10-CM

## 2024-04-24 DIAGNOSIS — M54.16 LUMBAR RADICULOPATHY: ICD-10-CM

## 2024-04-24 PROCEDURE — 99213 OFFICE O/P EST LOW 20 MIN: CPT | Performed by: ANESTHESIOLOGY

## 2024-04-24 PROCEDURE — 1125F AMNT PAIN NOTED PAIN PRSNT: CPT | Performed by: ANESTHESIOLOGY

## 2024-04-24 ASSESSMENT — PAIN - FUNCTIONAL ASSESSMENT: PAIN_FUNCTIONAL_ASSESSMENT: 0-10

## 2024-04-24 ASSESSMENT — PAIN SCALES - GENERAL: PAINLEVEL_OUTOF10: 6

## 2024-04-24 NOTE — PROGRESS NOTES
Subjective   Patient ID: Beverly An is a 69 y.o. female with a past medical history of lumbar postlaminectomy syndrome, Bertolotti syndrome, who is seen as a new patient in clinic.      She was previously seen by us 2 years ago [facility]      HPI:     She has 3 complaints today: Back and leg pain, bilateral hand numbness, tingling, pain, and right-sided subscapular pain    Back and leg pain: She underwent a right-sided lumbar transforaminal epidural steroid injection March 4, 2022 which she says provided 90 to 100% pain relief until now.  The pain travels from her low back and radiates down the posterior lateral aspect of her right thigh and does not go past her knee.  She does have numbness and tingling in the area.  She is able to stand for about 15 minutes before she has to sit down and the pain improves.  She can walk about a half a mile before she has to sit down due to the pain.  She tried a right-sided bursa injection done by her orthopedic surgeon that provided no pain relief.    Associated with this pain is pain in the right shoulder.  The pain is not made worse with coughing or sneezing.  The pain is described as dull.  She feels she is having shoulder pain due to changes in her gait from the back and leg pain and is causing compensation pain in her back muscles.    Hand pain: She worked as a  for many years, engages in needlepoint and other repetitive motion activities and has burning pain, tingling, numbness in bilateral hands most prominent in thumb and index finger.  She has previously tried splints at night, but she had to stop them as the splints themselves were making her numb.  She has not seen a surgeon for these.    Physical Therapy: The patient completed more than six weeks of formal physical therapy more than six months ago, but has done physician-directed exercises at home every day for greater than six weeks with minimal improvement  Other Conservative  Measures she has tried: Heating Pad, Ice, and Injections  Classes of medications tried in the past: Acetaminophen, NSAIDs, Gabapentenoids, and Topical agents    Review of Systems   13-point ROS done and negative except for HPI.     No current outpatient medications    Past Medical History:   Diagnosis Date    Personal history of other diseases of the musculoskeletal system and connective tissue     History of spinal stenosis    Personal history of other malignant neoplasm of kidney     History of malignant neoplasm of kidney        No past surgical history on file.     No family history on file.     Not on File     Objective     There were no vitals filed for this visit.     Physical Exam  General: NAD, well groomed, well nourished  Eyes: Non-icteric sclera, EOMI  Ears, Nose, Mouth, and Throat: External ears and nose appear to be without deformity or rash. No lesions or masses noted. Hearing is grossly intact.   Neck: Trachea midline  Respiratory: Nonlabored breathing   Cardiovascular: trace peripheral edema   Skin: No rashes or open lesions/ulcers identified on skin.    Back:   Palpation: Palpable muscle cord at approximately T7 that reproduces the patient's pain with palpation.  No tenderness to palpation over lumbar paraspinous muscles.   Straight leg raise: does not reproduce their pain, bilaterally   SI Joint: Pain with SHARMILA on the right    Hip: No pain over greater trochanters. and Pain not reproduced with hip internal/external rotation.     Neurologic:   Cranial nerves grossly intact.   Strength 5/5 and symmetric plantar/dorsiflexion   Sensation: Diminished light touch sensation over median nerve distribution bilaterally and in right L5 dermatome.   DTRs:normal and symmetric throughout  Castano: absent  Clonus: absent  Spurling negative bilaterally    Psychiatric: Alert, orientation to person, place, and time. Cooperative.    Imaging personally reviewed and independently interpreted: CT scan and MRI  reviewed.  She appears to have autofusion of L4/5 vertebral bodies, Modic changes of L4, L5, S1 vertebral bodies.  She has Bertolotti syndrome with possible pseudoarticulation versus fusion of the right L5 transverse process with the right iliac wing.    Assessment/Plan   69-year-old female with lumbar postlaminectomy syndrome, Bertolotti syndrome, right L5 radiculopathy with excellent response with prior right L5 transforaminal epidural steroid injection, who also has musculoskeletal shoulder pain from a muscle cord as well as bilateral carpal tunnel syndrome. She does not tolerate gabapentin, Lyrica, amitriptyline due to side effects.    Plan:  -EMG of bilateral upper extremities to evaluate for carpal tunnel, then will discuss US guided median nerve injections after the results of this study  -Refer to physical therapy for dry needling and musculoskeletal therapy for right sided thoracic myofascial pain  -Right L5 transforaminal epidural steroid injection    The patient has failed treatment with : Physical therapy , three or more classes of medications, alternative therapies, they have failed surgery, have significant limitations in their sleep quality due to the pain, have significant limitations of their quality of life due to the pain, have significant impairments of their activities of daily living (ADLs) due to the pain, have significant impairments of their instrumental activities of daily living (IADLs) due to the pain, and the procedure is medically indicated    We discussed  the risks, benefits and alternatives of the procedure including but not limited to: , Paralysis, Epidural hematoma, Post-dural puncture headache, Lack of efficacy , Transiently worsening pain , Bleeding, Infection , Nerve Damage, and The patient or her decision-maker expressed understanding and agreed to proceed. All questions were answered to the best of my ability.     Follow up: She had After procedure     The patient was invited  to contact us back anytime with any questions or concerns and follow-up with us in the office as needed.     There are no diagnoses linked to this encounter.    This note was generated with the aid of dictation software, there may be typos despite my attempts at proofreading.

## 2024-05-01 ENCOUNTER — EVALUATION (OUTPATIENT)
Dept: PHYSICAL THERAPY | Facility: CLINIC | Age: 69
End: 2024-05-01
Payer: MEDICARE

## 2024-05-01 DIAGNOSIS — M54.16 LUMBAR RADICULOPATHY: ICD-10-CM

## 2024-05-01 PROBLEM — M89.8X1 SHOULDER BLADE PAIN: Status: ACTIVE | Noted: 2024-05-01

## 2024-05-01 PROCEDURE — 97161 PT EVAL LOW COMPLEX 20 MIN: CPT | Mod: GP

## 2024-05-01 PROCEDURE — 97140 MANUAL THERAPY 1/> REGIONS: CPT | Mod: GP

## 2024-05-01 ASSESSMENT — ENCOUNTER SYMPTOMS
LOSS OF SENSATION IN FEET: 0
OCCASIONAL FEELINGS OF UNSTEADINESS: 0
DEPRESSION: 0

## 2024-05-01 NOTE — PROGRESS NOTES
Physical Therapy    Physical Therapy Evaluation and Treatment      Patient Name: Beverly An  MRN: [id number]  Today's Date: 5/1/2024  Time Calculation  Start Time: 1030  Stop Time: 1100  Time Calculation (min): 30 min    Visit #1  0% coins, $240 ded ($104.56 met), no copay, bmn maurice yr, no PA.     Assessment:    Patient presents with chronic lumbar and thoracic spine pain. Patient demonstrates impairments of periscapular weakness and limited trunk mobility. Patient would benefit from PT services to address current impairments and facilitate improvement in current activity limits. Educated patient on current POC, initial HEP and current examination findings. Patient verbalized understanding of all education and instruction provided today.     Plan:  OP PT Plan  Treatment/Interventions: Dry needling, Education/ Instruction, Electrical stimulation, Gait training, Hot pack, Manual therapy, Neuromuscular re-education, Self care/ home management, Taping techniques, Therapeutic exercises, Therapeutic activities, Ultrasound, Vasopneumatic device  PT Plan: Skilled PT  PT Frequency: 1 time per week  Duration: 20  Certification Period Start Date: 05/01/24  Certification Period End Date: 07/30/24  Number of Treatments Authorized: BMN  Rehab Potential: Good  Plan of Care Agreement: Patient    Current Problem:   1. Carpal tunnel syndrome, unspecified laterality  Referral to Physical Therapy      2. Lumbar radiculopathy  Referral to Physical Therapy          Subjective    General:   Patient presents with c/o chronic pain in her lumbar and thoracic spine. Patient denies any bowel/bladder dysfunction or numbness tingling. Patient states her pain worsens with walking, standing or repetitive movement. Patient has been referred to PT services by her pain management MD and is interested in dry needling for treatment of her symptoms.     Pain:   4/10 T-spine (near R shoulder blade)         Objective   Spine Musculoskeletal  Exam    Range of Motion    Cervical Spine       Cervical flexion: 1-2 finger breadths.     Cervical extension: normal.       Right      Lateral bending: reduced bend (0-25%).       Lateral rotation: reduced rotation (0-25%).       Left      Lateral bending: reduced bend (0-25%).       Lateral rotation: reduced rotation (0-25%).      Thoracolumbar       Flexion: 50%. Flexion detail: guarding.     Extension: 25%. Extension detail: pain and guarding.       Right      Lateral bendin%. Lateral bending detail: pain and guarding.       Lateral rotation: 75%. Lateral rotation detail: pain and guarding.       Left      Lateral bendin%. Lateral bending detail: pain and guarding.       Lateral rotation: 75%. Lateral rotation detail: pain and guarding.      Strength    Cervical Spine      Right      Shoulder external rotation: 4-/5.       Biceps: 4/5.       Triceps: 4/5.       Left      Shoulder external rotation: 4-/5.       Biceps: 4/5.       Triceps: 4/5.     Thoracolumbar       Right      Hip abductors: 4/5.       Hip flexion: 4/5.       Hip adduction: 4/5.        Left      Hip abductors: 4/5.       Hip flexion: 4/5.       Hip adduction: 4+/5.      Special Tests    Cervical Spine    Cervical distraction - neck: no effect    Cervical distraction - arm: no effect      Right      Spurling's: negative      Left      Spurling's: negative    Thoracolumbar      Right      SLR: back pain      Left      SLR: no back or leg pain     Shoulder Musculoskeletal Exam    Strength    Right      External rotation: 4-/5.       Internal rotation: 4-/5.       Abduction: 4-/5.       Biceps: 4/5.       Triceps: 4/5.     Left      External rotation: 4-/5.       Internal rotation: 4-/5.       Abduction: 4-/5.       Biceps: 4/5.       Triceps: 4/5.     Scapula    Right      Winging: medial      Scapulothoracic compress: improves pain    Special Tests    Right    Rotator Cuff Signs      Painful arc test: negative       Bear hug test:  negative     AC Joint Signs      Active horizontal adduction pain: negative      Middle Trapezius L: 3+/5  R: 3+    Outcome Measures:  Other Measures  Oswestry Disablity Index (ETHAN): 20%     Treatments:  Seated T-spine Ext over roll x10    Manual therapy:     Patient educated in dry needling precautions, indications, and contraindications. Patient is aware of the risks and benefits of procedure and wishes to have it performed. No adverse reaction to the dry needling noted.    Needles used: 2x30mm  Location needled: R middle trapezius, R upper trapezius  PT performed winding needle manipulation  Needles left in situ for 5 min    EDUCATION:  Outpatient Education  Individual(s) Educated: Patient  Education Provided: Anatomy, Body Mechanics, Fall Risk, Home Exercise Program, Home Safety, Physiology, POC, Posture    Goals:  Patient will be independent with HEP.    Patient will improve ETHAN score to </=10%    Patient will improve middle trapezius strength to >/=4/5 for improved postural stability.    Patient will improve postural awareness and demonstrate ability to self-correct.    Patient will demonstrate trunk AROM that is WNL and painless in all planes.    Patient will report 0/10 back pain with ADL performance.

## 2024-05-14 ENCOUNTER — TREATMENT (OUTPATIENT)
Dept: PHYSICAL THERAPY | Facility: CLINIC | Age: 69
End: 2024-05-14
Payer: MEDICARE

## 2024-05-14 DIAGNOSIS — M54.16 LUMBAR RADICULOPATHY: ICD-10-CM

## 2024-05-14 PROCEDURE — 97110 THERAPEUTIC EXERCISES: CPT | Mod: GP

## 2024-05-14 PROCEDURE — 97140 MANUAL THERAPY 1/> REGIONS: CPT | Mod: GP

## 2024-05-15 NOTE — PROGRESS NOTES
Physical Therapy    Physical Therapy Treatment    Patient Name: Beverly An  MRN: [id number]  Today's Date: 5/14/2024   Visit #2  0% coins, $240 ded ($104.56 met), no copay, bmn maurice yr, no PA.         Assessment:   Pt continues to tolerate dry needling well without adverse reactions. Added postural exercises to today's session which she was able to perform without increase in pain symptoms. Encouraged continued HEP performance along with hydration and heat for maximizing effect of dry needling.      Plan:   OP PT Plan  Treatment/Interventions: Dry needling, Education/ Instruction, Electrical stimulation, Gait training, Hot pack, Manual therapy, Neuromuscular re-education, Self care/ home management, Taping techniques, Therapeutic exercises, Therapeutic activities, Ultrasound, Vasopneumatic device  PT Plan: Skilled PT  PT Frequency: 1 time per week  Duration: 20  Certification Period Start Date: 05/01/24  Certification Period End Date: 07/30/24  Number of Treatments Authorized: BMN  Rehab Potential: Good  Plan of Care Agreement: Patient    Current Problem  1. Lumbar radiculopathy  Follow Up In Physical Therapy              Subjective    Pt notes improvement of symptoms after last visit.     Pain   3/10 R posterior shoulder        Treatments:  UBE 4'/4' Lv2  Seated T-spine Ext over roll x10   Rows with GTB 2x10  Shoulder Ext with GTB 2x10  [place] carries with bilat 6# 1/10 mile  Shrugs with bilat #6 2x10    Manual therapy:   Patient educated in dry needling precautions, indications, and contraindications. Patient is aware of the risks and benefits of procedure and wishes to have it performed. No adverse reaction to the dry needling noted.     Needles used: 2x30mm  Location needled: R middle trapezius, R upper trapezius  PT performed winding needle manipulation  Needles left in situ for 5 min  STM to R UT      OP EDUCATION:   HEP    Goals:  Patient will be independent with HEP.     Patient will improve ETHAN  score to </=10%     Patient will improve middle trapezius strength to >/=4/5 for improved postural stability.     Patient will improve postural awareness and demonstrate ability to self-correct.     Patient will demonstrate trunk AROM that is WNL and painless in all planes.     Patient will report 0/10 back pain with ADL performance.

## 2024-05-20 ENCOUNTER — HOSPITAL ENCOUNTER (OUTPATIENT)
Dept: NEUROLOGY | Facility: HOSPITAL | Age: 69
Discharge: HOME | End: 2024-05-20
Payer: MEDICARE

## 2024-05-20 DIAGNOSIS — G56.00 CARPAL TUNNEL SYNDROME, UNSPECIFIED LATERALITY: ICD-10-CM

## 2024-05-20 PROCEDURE — 95911 NRV CNDJ TEST 9-10 STUDIES: CPT

## 2024-05-20 NOTE — PROCEDURES
Electromyography Laboratory Report       Accreditation with Exemplary Status       Name Beverly An MRN [id number] Ref Provider [facility] [place] Technologist Jorge   Gender Female Age 69 Years [facility] Physician Chay Romo MD  Temp ºC 33    1955 Height 5 feet 9 inch Order No [id number] Study Date 2024 10:44 AM      Reason for Referral:   Carpal Tunnel Syndrome (CTS),     BEVERLY AN [id number] 2024 10:44 AM     1 of 1      Motor Nerve Conduction Study   Nerve/Sites Muscle Amplitude Latency Velocity F min     mV ms m/s ms     Right Left Ref. Right Left Ref. Right Left Ref. Right Left Ref.   Median - APB      Wrist APB 7.7 4.4 >=5.0 3.8 4.0 <=4.0    28.4 28.9 <=31.5      AC Fossa APB 6.6 4.3  8.1 8.4  53.1 52.6 >=50.0      Ulnar - ADM      Wrist ADM 12.1  >=7.0 3.0  <=3.1    28.1  <=30.9      B. Elbow ADM 9.7   6.2   56.1  >=50.0         A. Elbow ADM 10.0   7.6   67.6  >=50.0          Sensory Nerve Conduction Study   Nerve/Sites Rec. Site Amplitude Peak Lat Velocity     µV ms m/s     Right Left Ref. Right Left Ref. Right Left Ref.   Median      Wrist Index 11.6 16.3 >=10.0 3.7 3.8 <=3.8 58.7 55.3 >=50.0   Ulnar      Wrist Little 22.0  >=5.0 3.2  <=3.2 61.1  >=50.0   Radial      Forearm Snuff Box 23.2  >=10.0 2.8  <=2.7 48.0  >=50.0   Median, Ulnar - Palmar Mixed      Median  Palm Med Wr 35.6   2.3  <=2.2 46.5  >=49.0      Ulnar Palm Uln Wr 22.4   2.0  <=2.2 54.9  >=49.0                        EMG Summary Table     Spontaneous Voluntary Activity   Muscle Nerve Roots Ins Act Fibs/PSW Fasc Other Amp Dur Phases Recruitment Activation Notes   R. APB - Abd Poll Brev Median C8-T1 NL 0 0 - NL +1 N/+1 NL NL -   R. PT - Pron Teres Median C6-C7 NL 0 0 - NL NL NL NL NL -   R. FDI - First Dors Int Ulnar C8-T1 NL 0 0 - NL NL NL NL NL -   R. FCU - Flex Carp Uln Ulnar C7-T1 NL 0 0 - NL NL NL NL NL -   R. TB - Triceps Brach Radial C6-C8 NL 0 0 - NL NL NL NL NL -   R. BB - Biceps Brach Musculocutaneous C5-C6 NL  0 0 - NL NL NL NL NL -   R. MD - Deltoid (med) Axillary C5-C6 NL 0 0 - NL NL NL NL NL -   R. Cervical psp (low)  - NL 0 0 - NL NL NL NL NL -   R. Cervical psp (mid)  - NL 0 0 - NL NL NL NL NL -   R. Cervical psp (up)  - NL 0 0 - NL NL NL NL NL -   L. APB - Abd Poll Brev Median C8-T1 NL 0 0 - NL N/+1 NL SL NL -   L. PT - Pron Teres Median C6-C7 NL 0 0 - NL NL NL NL NL -   L. FDI - First Dors Int Ulnar C8-T1 NL 0 0 - NL NL NL NL NL -   L. FCU - Flex Carp Uln Ulnar C7-T1 NL 0 0 - NL NL NL NL NL -   L. TB - Triceps Brach Radial C6-C8 NL 0 0 - NL NL NL NL NL -   L. BB - Biceps Brach Musculocutaneous C5-C6 NL 0 0 - NL NL NL NL NL -   L. MD - Deltoid (med) Axillary C5-C6 NL 0 0 - NL NL NL NL NL -   L. Cervical psp (low)  - NL 0 0 - NL NL NL NL NL -   L. Cervical psp (mid)  - NL 0 0 - NL NL NL NL NL -   L. Cervical psp (up)  - NL 0 0 - NL NL NL NL NL -       NL = Normal, Inc = Increased, Dec = Decreased, CRD = Complex Repetitive Discharge; SL = Slightly Decreased; MO = Moderately Decreased; MK = Markedly Decreased; N/+1, +1, +2, +3 = Borderline, Slightly, Moderately, Markedly Increased; N/-1, -1, -2, -3 = Borderline, Slightly, Moderately, Markedly Decreased, N/A = Not Applicable    Summary:     Impression:  This study reveals [employer] evidence of chronic, neuropathic, axonal, sensorimotor processes affecting both median nerves at the wrists, consistent with the clinical diagnosis of bilateral carpal tunnel syndrome. These are of very mild degree bilaterally by electrical criteria. Note that bilateral CTS can result from diabetes mellitus or hypothyroidism. Bilateral splint use was reinforced to the patient for prophylaxis.  There is no suggestion by this study of more proximal processes e.g. plexopathy or radiculopathy, nor of more widespread processes e.g. peripheral neuropathy or mononeuritis multiplex.

## 2024-05-21 ENCOUNTER — TREATMENT (OUTPATIENT)
Dept: PHYSICAL THERAPY | Facility: CLINIC | Age: 69
End: 2024-05-21
Payer: MEDICARE

## 2024-05-21 DIAGNOSIS — M54.16 LUMBAR RADICULOPATHY: ICD-10-CM

## 2024-05-21 PROCEDURE — 97140 MANUAL THERAPY 1/> REGIONS: CPT | Mod: GP

## 2024-05-21 PROCEDURE — 97110 THERAPEUTIC EXERCISES: CPT | Mod: GP

## 2024-05-21 NOTE — PROGRESS NOTES
Physical Therapy    Physical Therapy Treatment    Patient Name: Beverly An  MRN: [id number]  Today's Date: 5/14/2024    PT Therapeutic Procedures Time Entry  Manual Therapy Time Entry: 10  Therapeutic Exercise Time Entry: 20    Visit #3  0% coins, $240 ded ($104.56 met), no copay, bmn maurice yr, no PA.         Assessment:   Pt continues to respond well to dry needling and postural exercises. Added rows, extensions and shrugs to HEP and issued TB. Encouraged continued HEP performance to maintain her progress.      Plan:   OP PT Plan  Treatment/Interventions: Dry needling, Education/ Instruction, Electrical stimulation, Gait training, Hot pack, Manual therapy, Neuromuscular re-education, Self care/ home management, Taping techniques, Therapeutic exercises, Therapeutic activities, Ultrasound, Vasopneumatic device  PT Plan: Skilled PT  PT Frequency: 1 time per week  Duration: 20  Certification Period Start Date: 05/01/24  Certification Period End Date: 07/30/24  Number of Treatments Authorized: BMN  Rehab Potential: Good  Plan of Care Agreement: Patient    Current Problem  1. Lumbar radiculopathy  Follow Up In Physical Therapy                Subjective    No new complaints.     Pain   3/10 R posterior shoulder        Treatments:  UBE 4'/4' Lv2  Seated T-spine Ext over roll x10   Rows with GTB 2x10  Shoulder Ext with GTB 2x10  [place] carries with bilat 6# 1/10 mile  Shrugs with bilat #6 2x10    Manual therapy:   Patient educated in dry needling precautions, indications, and contraindications. Patient is aware of the risks and benefits of procedure and wishes to have it performed. No adverse reaction to the dry needling noted.     Needles used: 2x30mm  Location needled: R upper trapezius  PT performed winding needle manipulation  Needles left in situ for 5 min  STM to R UT      OP EDUCATION:   HEP    Goals:  Patient will be independent with HEP.     Patient will improve ETHAN score to </=10%     Patient will improve  middle trapezius strength to >/=4/5 for improved postural stability.     Patient will improve postural awareness and demonstrate ability to self-correct.     Patient will demonstrate trunk AROM that is WNL and painless in all planes.     Patient will report 0/10 back pain with ADL performance.

## 2024-05-28 ENCOUNTER — TREATMENT (OUTPATIENT)
Dept: PHYSICAL THERAPY | Facility: CLINIC | Age: 69
End: 2024-05-28
Payer: MEDICARE

## 2024-05-28 DIAGNOSIS — M54.16 LUMBAR RADICULOPATHY: ICD-10-CM

## 2024-05-28 PROCEDURE — 97140 MANUAL THERAPY 1/> REGIONS: CPT | Mod: GP

## 2024-05-28 PROCEDURE — 97110 THERAPEUTIC EXERCISES: CPT | Mod: GP

## 2024-05-28 NOTE — PROGRESS NOTES
Physical Therapy    Physical Therapy Treatment    Patient Name: Beverly An  MRN: [id number]  Today's Date: 5/14/2024  Start time: 1013  Stop time: 1052  Time Calculation: 39 min        Visit #4  0% coins, $240 ded ($104.56 met), no copay, bmn maurice yr, no PA.         Assessment:  Patient has completed 4 PT visits and feels ready to continue her progress independently. Pt has achieved significant improvement in her symptoms and has demonstrated ability to perform HEP independently to manage her progress. Issued long term HEP handout with TB progressions. Patient verbalized understanding of all education and instruction provided today. Patient is discharged from PT.      Plan:   OP PT Plan  Treatment/Interventions: Dry needling, Education/ Instruction, Electrical stimulation, Gait training, Hot pack, Manual therapy, Neuromuscular re-education, Self care/ home management, Taping techniques, Therapeutic exercises, Therapeutic activities, Ultrasound, Vasopneumatic device  PT Plan: Skilled PT  PT Frequency: 1 time per week  Duration: 20  Certification Period Start Date: 05/01/24  Certification Period End Date: 07/30/24  Number of Treatments Authorized: BMN  Rehab Potential: Good  Plan of Care Agreement: Patient    Current Problem  1. Lumbar radiculopathy  Follow Up In Physical Therapy                Subjective    Patient reports shoulder and spinal pain have been improving with each PT session.    Pain   1/10 R posterior shoulder    Other Measures  Oswestry Disablity Index (ETHAN): 0%       Treatments:  UBE 4'/4' Lv2  -Performed HEP exercises listed below  -re-check billed as therex    Long Term HEP:   Access Code: [email or web address]  URL: [email or web address]  Date: 05/28/2024  Prepared by: Guerrero Duke    Exercises  - Shoulder External Rotation and Scapular Retraction with Resistance  - 1 x daily - 7 x weekly - 2 sets - 10 reps  - Standing Shoulder Row with Anchored Resistance  - 1 x daily - 7 x  weekly - 2 sets - 10 reps  - Shoulder extension with resistance - Neutral  - 1 x daily - 7 x weekly - 2 sets - 10 reps  - Standing Shoulder Horizontal Abduction with Resistance  - 1 x daily - 7 x weekly - 2 sets - 10 reps  - Seated Thoracic Lumbar Extension  - 2 x daily - 7 x weekly - 10 reps    Manual therapy:   Patient educated in dry needling precautions, indications, and contraindications. Patient is aware of the risks and benefits of procedure and wishes to have it performed. No adverse reaction to the dry needling noted.     Needles used: 2x30mm  Location needled: R upper trapezius x1, R middle trapezius x1  PT performed winding needle manipulation  Needles left in situ for 5 min  STM to R UT      OP EDUCATION:   HEP    Goals:  Patient will be independent with HEP.   -MET  Patient will improve ETHAN score to </=10%   -MET  Patient will improve middle trapezius strength to >/=4/5 for improved postural stability.   -MET  Patient will improve postural awareness and demonstrate ability to self-correct.  -MET   Patient will demonstrate trunk AROM that is WNL and painless in all planes.   -MET  Patient will report 0/10 back pain with ADL performance.   -MET

## 2024-06-06 NOTE — H&P
History Of Present Illness  Beverly An is a 69 y.o. female presenting with right-sided back pain and leg pain.     Past Medical History  Past Medical History:   Diagnosis Date    Personal history of other diseases of the musculoskeletal system and connective tissue     History of spinal stenosis    Personal history of other malignant neoplasm of kidney     History of malignant neoplasm of kidney       Surgical History  No past surgical history on file.     Social History  She has no history on file for tobacco use, alcohol use, and drug use.    Family History  No family history on file.     Allergies  Adhesive tape-silicones and Sulfa (sulfonamide antibiotics)    Review of Systems   13 point ROS done and negative except for the above.   Physical Exam     General: NAD, well nourished   Eyes: Non-icteric sclera, EOMI  Ears, Nose, Mouth, and Throat: External ears and nose appear to be without deformity or rash. No lesions or masses noted. Hearing is grossly intact.   Neck: Trachea midline  Respiratory: Nonlabored breathing   Cardiovascular: No JVD  Skin: No rashes or open lesions/ulcers identified on skin.    Last Recorded Vitals  There were no vitals taken for this visit.    Relevant Results           Assessment/Plan   Problem List Items Addressed This Visit    None    Patient is status post right-sided L4/L5 laminectomy.  MRI report available but not the images, unremarkable for any significant stenosis.  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  Based upon exam as well as excellent relief with transforaminal injections, will repeat.    Plan for right side L5 lumbar transforaminal epidural steroid injection.    Risks, benefits, alternatives to the procedure were discussed in detail and patient was amenable to proceeding.    Carmelo Tejeda MD

## 2024-06-07 ENCOUNTER — HOSPITAL ENCOUNTER (OUTPATIENT)
Dept: RADIOLOGY | Facility: CLINIC | Age: 69
Setting detail: OUTPATIENT SURGERY
Discharge: HOME | End: 2024-06-07
Payer: MEDICARE

## 2024-06-07 ENCOUNTER — HOSPITAL ENCOUNTER (OUTPATIENT)
Dept: OPERATING ROOM | Facility: CLINIC | Age: 69
Setting detail: OUTPATIENT SURGERY
Discharge: HOME | End: 2024-06-07
Payer: MEDICARE

## 2024-06-07 VITALS
WEIGHT: 267.64 LBS | SYSTOLIC BLOOD PRESSURE: 136 MMHG | RESPIRATION RATE: 18 BRPM | TEMPERATURE: 97.9 F | DIASTOLIC BLOOD PRESSURE: 64 MMHG | OXYGEN SATURATION: 95 % | HEART RATE: 62 BPM | BODY MASS INDEX: 40.94 KG/M2

## 2024-06-07 DIAGNOSIS — M54.16 LUMBAR RADICULOPATHY: ICD-10-CM

## 2024-06-07 PROCEDURE — 7100000009 HC PHASE TWO TIME - INITIAL BASE CHARGE

## 2024-06-07 PROCEDURE — 99152 MOD SED SAME PHYS/QHP 5/>YRS: CPT | Performed by: ANESTHESIOLOGY

## 2024-06-07 PROCEDURE — 64483 NJX AA&/STRD TFRM EPI L/S 1: CPT | Performed by: ANESTHESIOLOGY

## 2024-06-07 PROCEDURE — 3600000006 HC OR TIME - EACH INCREMENTAL 1 MINUTE - PROCEDURE LEVEL ONE

## 2024-06-07 PROCEDURE — 2500000004 HC RX 250 GENERAL PHARMACY W/ HCPCS (ALT 636 FOR OP/ED): Performed by: ANESTHESIOLOGY

## 2024-06-07 PROCEDURE — 7100000010 HC PHASE TWO TIME - EACH INCREMENTAL 1 MINUTE

## 2024-06-07 PROCEDURE — 2550000001 HC RX 255 CONTRASTS: Performed by: ANESTHESIOLOGY

## 2024-06-07 PROCEDURE — 3600000001 HC OR TIME - INITIAL BASE CHARGE - PROCEDURE LEVEL ONE

## 2024-06-07 PROCEDURE — 2500000005 HC RX 250 GENERAL PHARMACY W/O HCPCS: Performed by: ANESTHESIOLOGY

## 2024-06-07 RX ORDER — LIDOCAINE HYDROCHLORIDE 5 MG/ML
INJECTION, SOLUTION INFILTRATION; PERINEURAL AS NEEDED
Status: COMPLETED | OUTPATIENT
Start: 2024-06-07 | End: 2024-06-07

## 2024-06-07 RX ORDER — MIDAZOLAM HYDROCHLORIDE 1 MG/ML
INJECTION INTRAMUSCULAR; INTRAVENOUS AS NEEDED
Status: COMPLETED | OUTPATIENT
Start: 2024-06-07 | End: 2024-06-07

## 2024-06-07 RX ORDER — DEXAMETHASONE SODIUM PHOSPHATE 100 MG/10ML
INJECTION INTRAMUSCULAR; INTRAVENOUS AS NEEDED
Status: COMPLETED | OUTPATIENT
Start: 2024-06-07 | End: 2024-06-07

## 2024-06-07 RX ORDER — SODIUM BICARBONATE 42 MG/ML
INJECTION, SOLUTION INTRAVENOUS AS NEEDED
Status: COMPLETED | OUTPATIENT
Start: 2024-06-07 | End: 2024-06-07

## 2024-06-07 RX ADMIN — SODIUM BICARBONATE 1 MEQ: 42 INJECTION, SOLUTION INTRAVENOUS at 11:57

## 2024-06-07 RX ADMIN — LIDOCAINE HYDROCHLORIDE 10 ML: 5 INJECTION, SOLUTION INFILTRATION; PERINEURAL at 11:56

## 2024-06-07 RX ADMIN — IOHEXOL 5 ML: 240 INJECTION, SOLUTION INTRATHECAL; INTRAVASCULAR; INTRAVENOUS; ORAL at 11:55

## 2024-06-07 RX ADMIN — DEXAMETHASONE SODIUM PHOSPHATE 10 MG: 10 INJECTION INTRAMUSCULAR; INTRAVENOUS at 11:58

## 2024-06-07 RX ADMIN — MIDAZOLAM HYDROCHLORIDE 2 MG: 1 INJECTION, SOLUTION INTRAMUSCULAR; INTRAVENOUS at 11:54

## 2024-06-07 ASSESSMENT — PAIN SCALES - GENERAL
PAINLEVEL_OUTOF10: 3

## 2024-06-07 ASSESSMENT — COLUMBIA-SUICIDE SEVERITY RATING SCALE - C-SSRS
1. IN THE PAST MONTH, HAVE YOU WISHED YOU WERE DEAD OR WISHED YOU COULD GO TO SLEEP AND NOT WAKE UP?: NO
6. HAVE YOU EVER DONE ANYTHING, STARTED TO DO ANYTHING, OR PREPARED TO DO ANYTHING TO END YOUR LIFE?: NO
2. HAVE YOU ACTUALLY HAD ANY THOUGHTS OF KILLING YOURSELF?: NO

## 2024-06-07 ASSESSMENT — PAIN - FUNCTIONAL ASSESSMENT
PAIN_FUNCTIONAL_ASSESSMENT: 0-10

## 2024-06-07 NOTE — DISCHARGE INSTRUCTIONS
Post-surgery instructions:       Blood thinners: You may resume them tomorrow evening, if you take any.       Activity:  No lifting over 10 lbs for 24hrs.     Bandages:  Leave the bandages in place until this evening.     Showering/Bathing:    Okay to shower tomorrow morning.        Scheduling for pain management: [phone]       After hours, call the   ([phone]) and ask for the pain management answering service if you notice any of the below:         Call the doctor immediately: if you notice:         Excessive bleeding from procedure site (brisk bright red bleeding from the site or bleeding that soaks the bandages or does not stop)  Severe headache  Inability to walk, leg or arm weakness or numbness that is significantly worse after the procedure  Uncontrolled pain  Inability to control your bowels or bladder  Signs of infection: Fever above 101.5F, redness, swelling, pus or drainage from the site

## 2024-06-07 NOTE — OP NOTE
* No procedures listed * Operative Note     Date: 2024  [place] [facility]    Name: Beverly An, : 1955, Age: 69 y.o., MRN: [id number], Sex: female    Diagnosis  * No Diagnosis Codes entered * * No Diagnosis Codes entered *   Lumbar radiculitis  Procedures  * No procedures documented on diagnosis form *  Right L5 transforaminal epidural steroid injection  Surgeons   * No surgeons found in log *  Olman Conte  Resident/Fellow/Other Assistant:  * No surgeons found in log *  Anmol Tejeda  Procedure Summary  Anesthesia: * No anesthesia type entered *  ASA: ASA status not filed in the log.  Anesthesia Staff: No anesthesia staff entered.  Estimated Blood Loss: 0 mL  Intra-op Medications: * Intraprocedure medication information is unavailable because the case start and end events have not been set *      Intraprocedure I/O Totals       None           Specimen: No specimens collected     Staff:   Circulator: Johanna         Drains and/or Catheters: * None in log *    Tourniquet Times:         Implants:     Findings: No abnormal anatomical findings    Indications: Beverly An is an 69 y.o. female who is having surgery for * No pre-op diagnosis entered *.     The patient was seen in the preoperative area. The risks, benefits, complications, treatment options, non-operative alternatives, expected recovery and outcomes were discussed with the patient. The possibilities of reaction to medication, pulmonary aspiration, injury to surrounding structures, bleeding, recurrent infection, the need for additional procedures, failure to diagnose a condition, and creating a complication requiring transfusion or operation were discussed with the patient. The patient concurred with the proposed plan, giving informed consent.  The site of surgery was properly noted/marked if necessary per policy. The patient has been actively warmed in preoperative area. Preoperative antibiotics are not  indicated. Venous thrombosis prophylaxis are not indicated.    Procedure Details: History reviewed patient interviewed and examined.  Risks and benefits of procedures discussed and patient agreed to proceed and consent was signed.  With the patient in the prone position the lower back was prepped and draped in sterile fashion.  Under AP guidance L5-S1 vertebral bodies were identified.  In a right lateral oblique view the L5-S1 foramen were identified and used as a trajectory view.  A 22-gauge 5 spinal needle was introduced into L5 foramen.  Position was identified in AP and lateral.  IV contrast showed adequate foraminal spread without any vascular or intrathecal uptake.  Lidocaine 0.5% mixed with dexamethasone 10 mg a total of 3 cc was injected in each foramen.  Patient tolerated the procedure without any problems and was transferred to recovery room in stable condition.   Complications:  None; patient tolerated the procedure well.    Disposition: PACU - hemodynamically stable.  Condition: stable         Additional Details:     Attending Attestation: I was present and scrubbed for the entire procedure.    *No primary surgeon found*

## 2024-07-10 ENCOUNTER — APPOINTMENT (OUTPATIENT)
Dept: PAIN MEDICINE | Facility: CLINIC | Age: 69
End: 2024-07-10
Payer: MEDICARE

## 2024-07-10 DIAGNOSIS — M54.16 LUMBAR RADICULOPATHY: Primary | ICD-10-CM

## 2024-07-10 DIAGNOSIS — G56.00 CARPAL TUNNEL SYNDROME, UNSPECIFIED LATERALITY: ICD-10-CM

## 2024-07-10 PROCEDURE — 99213 OFFICE O/P EST LOW 20 MIN: CPT | Performed by: ANESTHESIOLOGY

## 2024-07-10 PROCEDURE — 1125F AMNT PAIN NOTED PAIN PRSNT: CPT | Performed by: ANESTHESIOLOGY

## 2024-07-10 RX ORDER — DULOXETIN HYDROCHLORIDE 20 MG/1
20 CAPSULE, DELAYED RELEASE ORAL DAILY
Qty: 30 CAPSULE | Refills: 11 | Status: SHIPPED | OUTPATIENT
Start: 2024-07-10 | End: 2025-07-10

## 2024-07-10 ASSESSMENT — PAIN SCALES - GENERAL: PAINLEVEL_OUTOF10: 6

## 2024-07-10 ASSESSMENT — PAIN - FUNCTIONAL ASSESSMENT: PAIN_FUNCTIONAL_ASSESSMENT: 0-10

## 2024-07-10 NOTE — PROGRESS NOTES
Subjective   Patient ID: Beverly An is a 69 y.o. female presenting for follow-up after R L5 trans-foraminal TONYA on 6/7/24.    HPI:   Patient underwent R L5 trans-foraminal TONYA on 6/7/24 with lower back pain relief of 100%, but only 30% of right radicular pain was relieved. Her current pain regimen is mobic 15mg, and PM Tylenol. Does not tolerate Gabapentin, has not tried duloxetine. Pain is worse at night (wakes her up from sleep), gardening and walking. R radicular pain is bothersome, in R buttock, R hip only and down her anterolateral R thigh, no pain below R knee. Intense periodic spasm in R buttock. Previous x rays of bilateral hips at [facility] in 2023 show minimal bilateral OA. Previous TONYA of L5 and S1 in 2022 and 2021 took patients pain away.    Of note, pt's EMG was positive for b/l CTS.    Physical Therapy: The patient completed more than six weeks of formal physical therapy more than six months ago, but has done physician-directed exercises at home for 2-3 times per week for greater than six weeks with minimal improvement  Other Conservative Measures she has tried: Heating Pad, Ice, and Injections  Classes of medications tried in the past: Acetaminophen, NSAIDs, Gabapentenoids, and Topical agents      Review of Systems   13-point ROS done and negative except for HPI.     Current Outpatient Medications   Medication Instructions    DULoxetine (CYMBALTA) 20 mg, oral, Daily, Do not crush or chew.       Past Medical History:   Diagnosis Date    Personal history of other diseases of the musculoskeletal system and connective tissue     History of spinal stenosis    Personal history of other malignant neoplasm of kidney     History of malignant neoplasm of kidney        No past surgical history on file.     No family history on file.     Allergies   Allergen Reactions    Adhesive Tape-Silicones Hives    Sulfa (Sulfonamide Antibiotics) Nausea/vomiting        Objective     There were no vitals filed for this  visit.     Physical Exam  General: NAD, well groomed, well nourished  Eyes: Non-icteric sclera, EOMI  Ears, Nose, Mouth, and Throat: External ears and nose appear to be without deformity or rash. No lesions or masses noted. Hearing is grossly intact.   Neck: Trachea midline  Respiratory: Nonlabored breathing   Cardiovascular: no peripheral edema   Skin: No rashes or open lesions/ulcers identified on skin.    Back:   Palpation: No tenderness to palpation over lumbar paraspinous muscles.   Straight leg raise: positive at 60 degrees on the right   SHARMILA Maneuver does reproduce pain on the right    R Hip: No pain over greater trochanters. and Pain  reproduced with hip external rotation and abduction.    Neurologic:   Cranial nerves grossly intact.   Strength 5/5 and symmetric plantar/dorsiflexion   Sensation: Normal to light touch throughout  DTRs:normal and symmetric patellar  Gait: normal  Assistive Devices used: none    Psychiatric: Alert, orientation to person, place, and time. Cooperative.    Imaging personally reviewed and independently interpreted: yes    Assessment/Plan   69-year-old female with lumbar postlaminectomy syndrome and Bertolotti syndrome presents for follow-up of right L5 transforaminal epidural steroid injection (6/7/24) with complete relief of back pain but only a 30% reduction in her right buttock and anterolateral right thigh pain. X rays from 2023 show minimal OA of both hips, therefore her right radicular pain is most likely stemming from her L4-L5 region.    Plan:  -Schedule patient for right L4 and L5 trans foraminal TONYA  -Start patient on duloxetine 20 mg once a day  -Refer patient to hand surgery. EMG positive for b/l CTS      The patient has failed treatment with : Physical therapy , three or more classes of medications, injections, and have significant limitations in their sleep quality due to the pain    We discussed  the risks, benefits and alternatives of the procedure including but  not limited to: , Lack of efficacy , Transiently worsening pain , Bleeding, Infection , and Nerve Damage    Follow up: After procedure    The patient was invited to contact us back anytime with any questions or concerns and follow-up with us in the office as needed.     Diagnoses and all orders for this visit:  Lumbar radiculopathy  -     Transforaminal; Future  -     FL pain management; Future  -     DULoxetine (Cymbalta) 20 mg DR capsule; Take 1 capsule (20 mg) by mouth once daily. Do not crush or chew.  Carpal tunnel syndrome, unspecified laterality  -     Referral to Orthopaedic Surgery; Future  Other orders  -     NPO Diet Except: Sips with meds; Effective now; Standing  -     Height and weight; Standing  -     Insert and maintain peripheral IV; Standing  -     Saline lock IV; Standing  -     Adult diet Regular; Standing  -     Vital Signs; Standing  -     Notify physician - Standard Parameters; Standing      This note was generated with the aid of dictation software, there may be typos despite my attempts at proofreading.      I saw and evaluated the patient. I personally obtained the key and critical portions of the history and physical exam or was physically present for key and critical portions performed by the resident/fellow. I reviewed the resident/fellow's documentation and discussed the patient with the resident/fellow. I agree with the resident/fellow's medical decision making as documented in the note.    Olman Conte MD

## 2024-07-22 ENCOUNTER — APPOINTMENT (OUTPATIENT)
Dept: ORTHOPEDIC SURGERY | Facility: CLINIC | Age: 69
End: 2024-07-22
Payer: MEDICARE

## 2024-07-22 DIAGNOSIS — G56.00 CARPAL TUNNEL SYNDROME, UNSPECIFIED LATERALITY: ICD-10-CM

## 2024-07-22 DIAGNOSIS — M65.30 ACQUIRED TRIGGER FINGER: Primary | ICD-10-CM

## 2024-07-22 PROCEDURE — 1036F TOBACCO NON-USER: CPT | Performed by: ORTHOPAEDIC SURGERY

## 2024-07-22 PROCEDURE — 99204 OFFICE O/P NEW MOD 45 MIN: CPT | Performed by: ORTHOPAEDIC SURGERY

## 2024-07-22 PROCEDURE — 1159F MED LIST DOCD IN RCRD: CPT | Performed by: ORTHOPAEDIC SURGERY

## 2024-07-22 PROCEDURE — 20550 NJX 1 TENDON SHEATH/LIGAMENT: CPT | Performed by: ORTHOPAEDIC SURGERY

## 2024-07-22 PROCEDURE — 20526 THER INJECTION CARP TUNNEL: CPT | Performed by: ORTHOPAEDIC SURGERY

## 2024-07-22 RX ORDER — LIDOCAINE HYDROCHLORIDE 10 MG/ML
1 INJECTION INFILTRATION; PERINEURAL
Status: COMPLETED | OUTPATIENT
Start: 2024-07-22 | End: 2024-07-22

## 2024-07-22 RX ORDER — LANOLIN ALCOHOL/MO/W.PET/CERES
1000 CREAM (GRAM) TOPICAL DAILY
COMMUNITY

## 2024-07-22 RX ORDER — ATORVASTATIN CALCIUM 20 MG/1
1 TABLET, FILM COATED ORAL DAILY
COMMUNITY
Start: 2023-04-30

## 2024-07-22 RX ORDER — LIDOCAINE HYDROCHLORIDE 10 MG/ML
0.5 INJECTION INFILTRATION; PERINEURAL
Status: COMPLETED | OUTPATIENT
Start: 2024-07-22 | End: 2024-07-22

## 2024-07-22 NOTE — PROGRESS NOTES
7/22/2024    Chief Complaint   Patient presents with    Right Wrist - New Patient Visit, Carpal Tunnel     EMG       History of Present Illness:  Patient Beverly An , 69 y.o. female, presents today, 7/22/2024, for evaluation of bilateral hand pain, numbness, and tingling .  She states has been ongoing about a year and a half and recently slightly worsening.  She had EMG done that showed very mild bilateral carpal tunnel syndrome.  She is having worsening of symptoms with nighttime wakefulness from sleep.  She states this is improved when she wears her nighttime splints, she forgets to put them on she has immediate symptoms.  Symptoms are maybe slightly worse on the right in comparison to the left but she is right-hand dominant dividual.  She is otherwise healthy.  She also has complaints of mechanical catching and locking with flexion extension of the right ring finger ongoing about 3 to 4 months.       Review of Systems:   GENERAL: Negative  GI: Negative  MUSCULOSKELETAL: See HPI  SKIN: Negative  NEURO:  Negative     Physical Exam:  GENERAL:  Alert and oriented to person, place, and time.  No acute distress and breathing comfortably; pleasant and cooperative with the examination.  HEENT:  Head is normocephalic and atraumatic.  NECK:  Supple, no visible swelling.  CARDIOVASCULAR:  No palpable tachycardia.  LUNGS:  No audible wheezing or labored breathing.  ABDOMEN:  Nondistended.  Extremities: Evaluation of bilateral upper extremities finds the patient to have a palpable radial artery at the wrist with brisk capillary refill to all digits. The patient has intact sensorium to axillary, radial, median and ulnar nerves. There are no open wounds. There are no signs of infection. There is no evidence of lymphedema or lymphatic streaking. The patient has supple compartments of the bilateral arms, forearms and hands.  Positive Tinel's over the median nerves bilaterally.  Positive Phalen's maneuver  bilaterally.  Positive dry compression maneuver on the right.  She demonstrates tenderness and mechanical catching and locking with palpation over the A1 pulley of the right ring finger with flexion and extension.     Imaging/Test Results:  EMG from May 20, 2024 shows evidence of very mild bilateral carpal tunnel syndrome.     Assessment:  #1: Bilateral carpal tunnel syndrome.  #2: Right ring finger trigger digit.     Plan:  Operative and nonoperative treatment strategies were discussed.  Recommendations were made for initial nonoperative management through diagnostic/therapeutic injection.  Patient was amenable to this if she has preference for nonoperative management.  We also recommend Kenalog injection into the right ring finger A1 pulley for treatment of trigger finger.  Injections were performed by Dr. De Jesus and tolerated well by patient.  Continue with nighttime bracing in the interim.  Follow-up in 4 weeks for repeat clinical exam.  No radiographs necessary upon return.  Patient verbalized agreement and understanding of plan for care.  All questions answered at today's visit.        Hand / UE Inj/Asp: R carpal tunnel for carpal tunnel syndrome on 7/22/2024 10:47 AM  Indications: diagnostic and therapeutic  Details: 25 G needle, volar approach  Medications: 10 mg triamcinolone acetonide 10 mg/mL; 1 mL lidocaine 10 mg/mL (1 %)  Outcome: tolerated well, no immediate complications    Right Carpal Tunnel Injection: It was explained to the patient that the risks of a steroid injection include but are not limited to infection, local skin irritation, skin atrophy, calcification, continued pain and discomfort, elevated blood sugar, burning, failure to relieve pain, and possible late infection. The patient verbalized good insight and verbalized consent for the injection. It was further explained that the post-injection discomfort can be alleviated with additional medications, ice, elevation, and rest over the first  24 hours, and that these modalities are recommended.  After informed consent was provided, patient identification was confirmed, and allergies were verified, the patient was appropriately positioned. The site was marked and time-out performed.    Using aseptic technique, a solution containing 1 mL of 10 mg of Kenalog and 1 mL of 1% lidocaine without epinephrine was injected into the patient's right carpal tunnel. A 25-gauge needle was inserted just ulnar to the anatomic course of the palmaris longus tendon at the level of the distal wrist flexion crease. It was slowly advanced deep to the distal antebrachial fascia. The solution was then injected slowly, taking care to ensure that the patient experienced no paresthesias during the injection of the solution. After injection of the solution, the patient was asked to perform active flexion and extension of the digits and wrist to help disperse the solution. A band-aid was then placed at the injection site. The patient tolerated this right carpal tunnel injection well.      Procedure, treatment alternatives, risks and benefits explained, specific risks discussed. Consent was given by the patient. Immediately prior to procedure a time out was called to verify the correct patient, procedure, equipment, support staff and site/side marked as required. Patient was prepped and draped in the usual sterile fashion.       Hand / UE Inj/Asp: L carpal tunnel for carpal tunnel syndrome on 7/22/2024 10:48 AM  Indications: diagnostic and therapeutic  Details: 25 G needle, volar approach  Medications: 10 mg triamcinolone acetonide 10 mg/mL; 1 mL lidocaine 10 mg/mL (1 %)  Outcome: tolerated well, no immediate complications    Left Carpal Tunnel Injection: It was explained to the patient that the risks of a steroid injection include but are not limited to infection, local skin irritation, skin atrophy, calcification, continued pain and discomfort, elevated blood sugar, burning, failure  to relieve pain, and possible late infection. The patient verbalized good insight and verbalized consent for the injection. It was further explained that the post-injection discomfort can be alleviated with additional medications, ice, elevation, and rest over the first 24 hours, and that these modalities are recommended.  After informed consent was provided, patient identification was confirmed, and allergies were verified, the patient was appropriately positioned. The site was marked and time-out performed.    Using aseptic technique, a solution containing 1 mL of 10 mg of Kenalog and 1 mL of 1% lidocaine without epinephrine was injected into the patient's left carpal tunnel. A 25-gauge needle was inserted just ulnar to the anatomic course of the palmaris longus tendon at the level of the distal wrist flexion crease. It was slowly advanced deep to the distal antebrachial fascia. The solution was then injected slowly, taking care to ensure that the patient experienced no paresthesias during the injection of the solution. After injection of the solution, the patient was asked to perform active flexion and extension of the digits and wrist to help disperse the solution. A band-aid was then placed at the injection site. The patient tolerated this left carpal tunnel injection well.      Procedure, treatment alternatives, risks and benefits explained, specific risks discussed. Consent was given by the patient. Immediately prior to procedure a time out was called to verify the correct patient, procedure, equipment, support staff and site/side marked as required. Patient was prepped and draped in the usual sterile fashion.       Hand / UE Inj/Asp: R ring A1 for trigger finger on 7/22/2024 10:48 AM  Indications: pain and tendon swelling  Details: 25 G needle, volar approach  Medications: 5 mg triamcinolone acetonide 10 mg/mL; 0.5 mL lidocaine 10 mg/mL (1 %)  Outcome: tolerated well, no immediate complications  Procedure,  treatment alternatives, risks and benefits explained, specific risks discussed. Consent was given by the patient. Immediately prior to procedure a time out was called to verify the correct patient, procedure, equipment, support staff and site/side marked as required. Patient was prepped and draped in the usual sterile fashion.         In a face to face encounter, I performed a history and physical examination, discussed pertinent diagnostic studies if indicated, and discussed diagnosis and management strategies with both the patient and the mid-level provider. I reviewed the mid-level's note and agree with the documented findings and plan of care.  Patient presents today for evaluation of symptoms compatible with bilateral carpal tunnel syndrome and right ring finger trigger.  EMG shows evidence for bilateral carpal tunnel syndrome.  Physical exam shows positive Tinel's of course of median nerve to bilateral wrist and focal tenderness along with mechanical triggering to right ring finger trigger.  Treatment options were discussed.  We talked about operative and nonoperative strategies.  Patient elects for initial trial of bilateral carpal tunnel injection and right ring finger trigger finger injection and 4-week follow-up.  No x-rays upon return.

## 2024-07-23 ENCOUNTER — PREP FOR PROCEDURE (OUTPATIENT)
Dept: PAIN MEDICINE | Facility: CLINIC | Age: 69
End: 2024-07-23
Payer: MEDICARE

## 2024-07-23 DIAGNOSIS — M54.16 LUMBAR RADICULITIS: Primary | ICD-10-CM

## 2024-07-24 ENCOUNTER — HOSPITAL ENCOUNTER (OUTPATIENT)
Dept: OPERATING ROOM | Facility: CLINIC | Age: 69
Discharge: HOME | End: 2024-07-24
Payer: MEDICARE

## 2024-07-24 VITALS
OXYGEN SATURATION: 99 % | TEMPERATURE: 98.8 F | WEIGHT: 264.77 LBS | BODY MASS INDEX: 39.22 KG/M2 | RESPIRATION RATE: 18 BRPM | HEART RATE: 60 BPM | DIASTOLIC BLOOD PRESSURE: 72 MMHG | HEIGHT: 69 IN | SYSTOLIC BLOOD PRESSURE: 144 MMHG

## 2024-07-24 DIAGNOSIS — M54.16 LUMBAR RADICULOPATHY: ICD-10-CM

## 2024-07-24 PROCEDURE — 64484 NJX AA&/STRD TFRM EPI L/S EA: CPT | Performed by: ANESTHESIOLOGY

## 2024-07-24 PROCEDURE — 3600000001 HC OR TIME - INITIAL BASE CHARGE - PROCEDURE LEVEL ONE

## 2024-07-24 PROCEDURE — 7100000009 HC PHASE TWO TIME - INITIAL BASE CHARGE

## 2024-07-24 PROCEDURE — 7100000010 HC PHASE TWO TIME - EACH INCREMENTAL 1 MINUTE

## 2024-07-24 PROCEDURE — 2550000001 HC RX 255 CONTRASTS: Performed by: ANESTHESIOLOGY

## 2024-07-24 PROCEDURE — 3600000006 HC OR TIME - EACH INCREMENTAL 1 MINUTE - PROCEDURE LEVEL ONE

## 2024-07-24 PROCEDURE — 99152 MOD SED SAME PHYS/QHP 5/>YRS: CPT | Performed by: ANESTHESIOLOGY

## 2024-07-24 PROCEDURE — 2500000005 HC RX 250 GENERAL PHARMACY W/O HCPCS: Performed by: ANESTHESIOLOGY

## 2024-07-24 PROCEDURE — 64483 NJX AA&/STRD TFRM EPI L/S 1: CPT | Performed by: ANESTHESIOLOGY

## 2024-07-24 PROCEDURE — 2500000004 HC RX 250 GENERAL PHARMACY W/ HCPCS (ALT 636 FOR OP/ED): Performed by: ANESTHESIOLOGY

## 2024-07-24 RX ORDER — ERGOCALCIFEROL 1.25 MG/1
1.25 CAPSULE ORAL
COMMUNITY

## 2024-07-24 RX ORDER — LOSARTAN POTASSIUM 50 MG/1
50 TABLET ORAL DAILY
COMMUNITY

## 2024-07-24 RX ORDER — METOPROLOL SUCCINATE 50 MG/1
50 TABLET, EXTENDED RELEASE ORAL DAILY
COMMUNITY

## 2024-07-24 RX ORDER — MELOXICAM 15 MG/1
15 TABLET ORAL DAILY
COMMUNITY

## 2024-07-24 RX ORDER — METHYLPREDNISOLONE ACETATE 40 MG/ML
INJECTION, SUSPENSION INTRA-ARTICULAR; INTRALESIONAL; INTRAMUSCULAR; SOFT TISSUE AS NEEDED
Status: COMPLETED | OUTPATIENT
Start: 2024-07-24 | End: 2024-07-24

## 2024-07-24 RX ORDER — LANOLIN ALCOHOL/MO/W.PET/CERES
500 CREAM (GRAM) TOPICAL DAILY
COMMUNITY

## 2024-07-24 RX ORDER — MIDAZOLAM HYDROCHLORIDE 1 MG/ML
INJECTION, SOLUTION INTRAMUSCULAR; INTRAVENOUS AS NEEDED
Status: COMPLETED | OUTPATIENT
Start: 2024-07-24 | End: 2024-07-24

## 2024-07-24 RX ORDER — VERAPAMIL HYDROCHLORIDE 120 MG/1
120 TABLET, FILM COATED ORAL 3 TIMES DAILY
COMMUNITY

## 2024-07-24 RX ORDER — POTASSIUM CHLORIDE 750 MG/1
10 TABLET, FILM COATED, EXTENDED RELEASE ORAL DAILY
COMMUNITY

## 2024-07-24 RX ORDER — CALCIUM CARBONATE 300MG(750)
400 TABLET,CHEWABLE ORAL DAILY
COMMUNITY

## 2024-07-24 RX ORDER — LIDOCAINE HYDROCHLORIDE 5 MG/ML
INJECTION, SOLUTION INFILTRATION; INTRAVENOUS AS NEEDED
Status: COMPLETED | OUTPATIENT
Start: 2024-07-24 | End: 2024-07-24

## 2024-07-24 RX ORDER — SODIUM BICARBONATE 42 MG/ML
INJECTION, SOLUTION INTRAVENOUS AS NEEDED
Status: COMPLETED | OUTPATIENT
Start: 2024-07-24 | End: 2024-07-24

## 2024-07-24 RX ORDER — CHLORTHALIDONE 25 MG/1
TABLET ORAL DAILY
COMMUNITY

## 2024-07-24 ASSESSMENT — PAIN SCALES - GENERAL
PAINLEVEL_OUTOF10: 0 - NO PAIN
PAINLEVEL_OUTOF10: 2

## 2024-07-24 ASSESSMENT — COLUMBIA-SUICIDE SEVERITY RATING SCALE - C-SSRS
6. HAVE YOU EVER DONE ANYTHING, STARTED TO DO ANYTHING, OR PREPARED TO DO ANYTHING TO END YOUR LIFE?: NO
1. IN THE PAST MONTH, HAVE YOU WISHED YOU WERE DEAD OR WISHED YOU COULD GO TO SLEEP AND NOT WAKE UP?: NO
2. HAVE YOU ACTUALLY HAD ANY THOUGHTS OF KILLING YOURSELF?: NO

## 2024-07-24 ASSESSMENT — PAIN - FUNCTIONAL ASSESSMENT
PAIN_FUNCTIONAL_ASSESSMENT: 0-10

## 2024-07-24 ASSESSMENT — PATIENT HEALTH QUESTIONNAIRE - PHQ9
1. LITTLE INTEREST OR PLEASURE IN DOING THINGS: NOT AT ALL
SUM OF ALL RESPONSES TO PHQ9 QUESTIONS 1 AND 2: 0
2. FEELING DOWN, DEPRESSED OR HOPELESS: NOT AT ALL

## 2024-07-24 ASSESSMENT — ENCOUNTER SYMPTOMS
DEPRESSION: 0
OCCASIONAL FEELINGS OF UNSTEADINESS: 0
LOSS OF SENSATION IN FEET: 0

## 2024-07-24 NOTE — DISCHARGE INSTRUCTIONS
OUTPATIENT SUR[facility]ERY CENTER DISCHARGE INSTRUCTIONS FOR ANESTHESIOLOGY PAIN SERVICE PATIENTS     Dr. Conte    PLEASE CAREFULLY FOLLOW THE INSTRUCTIONS    If you received sedation for your procedure NO DRIVING, NO DRINKING ALCOHOL, TAKING SEDATIVE MEDICATIONS, OR ANY IMPORTANT DECISION MAKING FOR 24 HOURS.     Follow up call in 2 weeks to report how your condition is with Rosemary.  Rosemary's Phone Number [phone]    Your pain may not be gone immediately after this procedure; it generally takes 3 to 5 days for the steroid to work.     Keep the needle site clean and dry for 24 hours.    Observe the needle site for excessive bleeding (slow general oozing that completely soaks the dressing or fresh bright red bleeding).    In either case, apply pressure to the area, elevate it if possible and call your doctor at once.    Take medicines as directed.    You may remove the bandage after 24 hours and shower at that time.      Observe/monitor for the following signs and symptoms:    Needle site: for Change in color, Numbness or tingling    Coldness to touch, Swelling, or Drainage    Temperature of 101.5 or higher    Increased or uncontrollable pain   If you notice any of the above signs and symptoms, please call your doctor at once.       If any problems occur, or if you have any further questions, please call your doctor as soon as possible.     If you find that you cannot reach your doctor, but feel that your condition needs a doctor's attention   Call the after hours phone number  [phone] ask for Pain Management Resident to be paged: pager Number [phone] Or go to the nearest Emergency Room

## 2024-07-24 NOTE — H&P
HISTORY AND PHYSICAL    History Of Present Illness  Beverly An is a 69 y.o. female presenting with chronic pain. Here for Lumbar transforaminal epidural steroid injection(s) on the right  she denies any recent antibiotic use or infections, she denies any blood thinner use , and she denies contrast or local anesthetic allergies     Past Medical History  Past Medical History:   Diagnosis Date    History of kidney cancer     History of meningioma     Hyperlipidemia     Hypertension     Personal history of other diseases of the musculoskeletal system and connective tissue     History of spinal stenosis    Personal history of other malignant neoplasm of kidney     History of malignant neoplasm of kidney       Surgical History  Past Surgical History:   Procedure Laterality Date    RENAL MASS EXCISION Right     SPINE SURGERY      TOTAL KNEE ARTHROPLASTY Bilateral         Social History  She reports that she has never smoked. She has never used smokeless tobacco. She reports that she does not currently use alcohol. She reports that she does not use drugs.    Family History  No family history on file.     Allergies  Adhesive, Adhesive tape-silicones, and Sulfa (sulfonamide antibiotics)    Review of Systems   12 point ROS done and negative except for the above.   Physical Exam     General: NAD, well groomed, well nourished  Eyes: Non-icteric sclera, EOMI  Ears, Nose, Mouth, and Throat: External ears and nose appear to be without deformity or rash. No lesions or masses noted. Hearing is grossly intact.   Neck: Trachea midline  Respiratory: Nonlabored breathing   Cardiovascular: No peripheral edema   Skin: No rashes or open lesions/ulcers identified on skin.    Last Recorded Vitals  There were no vitals taken for this visit.    Relevant Results           Assessment/Plan       Risks, benefits, alternatives discussed. All questions answered to the best of my ability. Patient agrees to proceed.   -We will proceed with  planned procedure        Naveed Rodriguez MD  Chronic Pain Fellow  [facility]

## 2024-07-25 ASSESSMENT — PAIN SCALES - GENERAL: PAINLEVEL_OUTOF10: 0 - NO PAIN

## 2024-07-25 NOTE — ADDENDUM NOTE
Encounter addended by: Cece Smith RN on: 7/25/2024 9:31 AM   Actions taken: Contacts section saved, Flowsheet accepted

## 2024-08-26 ENCOUNTER — APPOINTMENT (OUTPATIENT)
Dept: ORTHOPEDIC SURGERY | Facility: CLINIC | Age: 69
End: 2024-08-26
Payer: MEDICARE

## 2024-09-20 ENCOUNTER — APPOINTMENT (OUTPATIENT)
Dept: PAIN MEDICINE | Facility: CLINIC | Age: 69
End: 2024-09-20
Payer: MEDICARE

## 2024-09-20 DIAGNOSIS — M54.16 LUMBAR RADICULOPATHY: Primary | ICD-10-CM

## 2024-09-20 PROCEDURE — 1125F AMNT PAIN NOTED PAIN PRSNT: CPT | Performed by: ANESTHESIOLOGY

## 2024-09-20 PROCEDURE — 99213 OFFICE O/P EST LOW 20 MIN: CPT | Performed by: ANESTHESIOLOGY

## 2024-09-20 RX ORDER — TIZANIDINE 2 MG/1
2 TABLET ORAL NIGHTLY PRN
Qty: 30 TABLET | Refills: 0 | Status: SHIPPED | OUTPATIENT
Start: 2024-09-20 | End: 2024-10-20

## 2024-09-20 RX ORDER — HYDROCODONE BITARTRATE AND ACETAMINOPHEN 5; 325 MG/1; MG/1
1 TABLET ORAL 3 TIMES DAILY PRN
Qty: 21 TABLET | Refills: 0 | Status: SHIPPED | OUTPATIENT
Start: 2024-09-20 | End: 2024-09-27

## 2024-09-20 ASSESSMENT — PAIN SCALES - GENERAL: PAINLEVEL_OUTOF10: 6

## 2024-09-20 ASSESSMENT — PAIN - FUNCTIONAL ASSESSMENT: PAIN_FUNCTIONAL_ASSESSMENT: 0-10

## 2024-09-20 NOTE — PROGRESS NOTES
History Of Present Illness  Beverly An is a 69 y.o. female presenting with low back pain radiating to lateral aspect of right lower extremity.  Patient underwent L4 and L5 transforaminal epidural steroid injection July 2024.  Patient reports 60% relief of her right leg pain.  Today patient is complaining of leg spasms occurring in the right leg mainly at night.  Patient says that the spasms occur at rest and are not aggravated with walking or standing. Patient has no history of any peripheral vascular insufficiency.  No history of a weakness in the right lower extremity.  MRI lumbosacral spine done in 2021 L5-S1 disc space narrowing with facet hypertrophy but no significant canal stenosis.  Patient was started on duloxetine 20 mg once daily however she was not able to tolerate it.     Past Medical History  She has a past medical history of History of kidney cancer, History of meningioma, Hyperlipidemia, Hypertension, Personal history of other diseases of the musculoskeletal system and connective tissue, and Personal history of other malignant neoplasm of kidney.    Surgical History  She has a past surgical history that includes Renal mass excision (Right); Total knee arthroplasty (Bilateral); and Spine surgery.     Social History  She reports that she has never smoked. She has never used smokeless tobacco. She reports that she does not currently use alcohol. She reports that she does not use drugs.    Family History  No family history on file.     Allergies  Adhesive, Adhesive tape-silicones, and Sulfa (sulfonamide antibiotics)    Review of systems:   13-point review of systems done and negative except as noted in HPI      Physical Exam   Vital signs: Reviewed  Constitutional: No acute distress, well appearing and well nourished. Patient appears stated age.   Eyes: Conjunctiva non-icteric and eye lids are without obvious rash or drooping. Pupils are symmetric.   Ears, Nose, Mouth, and Throat: External  ears and nose appear to be without deformity or rash. No lesions or masses noted. Hearing is grossly intact.   Neck:. No JVD noted, tracheal position is midline.   Head and Face: Examination of the head and face revealed no abnormalities.   Respiratory: No gasping or shortness of breath noted, no use of accessory muscles noted.   Cardiovascular: Examination for edema is normal.   GI: Abdomen nontender to palpation.   Skin: No rashes or open lesions/ulcers identified on skin.   Musk: Digits/nails show no clubbing or cyanosis. No asymmetry or masses noted of the musculature. Examination of the muscles/joints/bones show normal range of motion. Gait is grossly normal.  Able to walk on toes and heels.   Neurologic: Cranial nerves II-XII intact, motor strength 5/5 muscle strength of the lower extremities bilaterally and equal. 5/5 muscle strength of the upper extremities bilaterally and equal.   Reflexes: normal.   Sensation: Normal to touch and pinprick in lower extremities bilaterally  Provocative tests: Positive straight leg raise right side       Last Recorded Vitals  There were no vitals taken for this visit.    Relevant Results            Last OARRS Review: No data recorded    I have personally reviewed the OARRS report for Beevrly SUSY An I have considered the risks of abuse, dependence, addiction and diversion       Assessment/Plan   Diagnoses and all orders for this visit:  Lumbar radiculopathy      Plan  Tizanidine 2 mg once at night  Norco 5/325 1 tablet 3 times daily please provide 1 week supply  Consider repeating MRI of lumbosacral spine       Olman Conte MD

## 2024-10-22 RX ORDER — POLYETHYLENE GLYCOL 3350, SODIUM CHLORIDE, SODIUM BICARBONATE, POTASSIUM CHLORIDE 420; 11.2; 5.72; 1.48 G/4L; G/4L; G/4L; G/4L
4000 POWDER, FOR SOLUTION ORAL ONCE
Qty: 4000 ML | Refills: 0 | Status: SHIPPED | OUTPATIENT
Start: 2024-10-22 | End: 2024-10-22

## 2024-10-28 ENCOUNTER — PREP FOR PROCEDURE (OUTPATIENT)
Dept: GASTROENTEROLOGY | Age: 69
End: 2024-10-28

## 2024-10-28 RX ORDER — SODIUM CHLORIDE 0.9 % (FLUSH) 0.9 %
5-40 SYRINGE (ML) INJECTION EVERY 12 HOURS SCHEDULED
Status: CANCELLED | OUTPATIENT
Start: 2024-10-28

## 2024-10-28 RX ORDER — SODIUM CHLORIDE 9 MG/ML
INJECTION, SOLUTION INTRAVENOUS PRN
Status: CANCELLED | OUTPATIENT
Start: 2024-10-28

## 2024-10-28 RX ORDER — SODIUM CHLORIDE 9 MG/ML
INJECTION, SOLUTION INTRAVENOUS CONTINUOUS
Status: CANCELLED | OUTPATIENT
Start: 2024-10-28

## 2024-10-28 RX ORDER — SODIUM CHLORIDE 0.9 % (FLUSH) 0.9 %
5-40 SYRINGE (ML) INJECTION PRN
Status: CANCELLED | OUTPATIENT
Start: 2024-10-28

## 2024-11-04 ENCOUNTER — ANESTHESIA EVENT (OUTPATIENT)
Dept: ENDOSCOPY | Age: 69
End: 2024-11-04
Payer: MEDICARE

## 2024-11-05 ENCOUNTER — ANESTHESIA (OUTPATIENT)
Dept: ENDOSCOPY | Age: 69
End: 2024-11-05
Payer: MEDICARE

## 2024-11-05 ENCOUNTER — HOSPITAL ENCOUNTER (OUTPATIENT)
Age: 69
Setting detail: OUTPATIENT SURGERY
Discharge: HOME | End: 2024-11-05
Attending: SPECIALIST | Admitting: SPECIALIST
Payer: MEDICARE

## 2024-11-05 VITALS
BODY MASS INDEX: 38.21 KG/M2 | WEIGHT: 258 LBS | TEMPERATURE: 98.1 F | DIASTOLIC BLOOD PRESSURE: 78 MMHG | HEIGHT: 69 IN | RESPIRATION RATE: 18 BRPM | SYSTOLIC BLOOD PRESSURE: 137 MMHG | OXYGEN SATURATION: 95 % | HEART RATE: 70 BPM

## 2024-11-05 DIAGNOSIS — Z12.11 COLON CANCER SCREENING: ICD-10-CM

## 2024-11-05 PROCEDURE — 7100000011 HC PHASE II RECOVERY - ADDTL 15 MIN: Performed by: SPECIALIST

## 2024-11-05 PROCEDURE — 2709999900 HC NON-CHARGEABLE SUPPLY: Performed by: SPECIALIST

## 2024-11-05 PROCEDURE — 3609027000 HC COLONOSCOPY: Performed by: SPECIALIST

## 2024-11-05 PROCEDURE — 2580000003 HC RX 258: Performed by: SPECIALIST

## 2024-11-05 PROCEDURE — 7100000010 HC PHASE II RECOVERY - FIRST 15 MIN: Performed by: SPECIALIST

## 2024-11-05 PROCEDURE — 88305 TISSUE EXAM BY PATHOLOGIST: CPT

## 2024-11-05 PROCEDURE — 6360000002 HC RX W HCPCS: Performed by: NURSE ANESTHETIST, CERTIFIED REGISTERED

## 2024-11-05 PROCEDURE — 3700000001 HC ADD 15 MINUTES (ANESTHESIA): Performed by: SPECIALIST

## 2024-11-05 PROCEDURE — 3700000000 HC ANESTHESIA ATTENDED CARE: Performed by: SPECIALIST

## 2024-11-05 DEVICE — INSTINCT PLUS ENDOSCOPIC CLIPPING DEVICE
Type: IMPLANTABLE DEVICE | Status: FUNCTIONAL
Brand: INSTINCT

## 2024-11-05 RX ORDER — PROPOFOL 10 MG/ML
INJECTION, EMULSION INTRAVENOUS
Status: DISCONTINUED | OUTPATIENT
Start: 2024-11-05 | End: 2024-11-05 | Stop reason: SDUPTHER

## 2024-11-05 RX ORDER — SODIUM CHLORIDE 0.9 % (FLUSH) 0.9 %
5-40 SYRINGE (ML) INJECTION EVERY 12 HOURS SCHEDULED
Status: DISCONTINUED | OUTPATIENT
Start: 2024-11-05 | End: 2024-11-05 | Stop reason: HOSPADM

## 2024-11-05 RX ORDER — SODIUM CHLORIDE 0.9 % (FLUSH) 0.9 %
5-40 SYRINGE (ML) INJECTION PRN
Status: DISCONTINUED | OUTPATIENT
Start: 2024-11-05 | End: 2024-11-05 | Stop reason: HOSPADM

## 2024-11-05 RX ORDER — SODIUM CHLORIDE 9 MG/ML
INJECTION, SOLUTION INTRAVENOUS CONTINUOUS
Status: DISCONTINUED | OUTPATIENT
Start: 2024-11-05 | End: 2024-11-05 | Stop reason: HOSPADM

## 2024-11-05 RX ORDER — SODIUM CHLORIDE 9 MG/ML
INJECTION, SOLUTION INTRAVENOUS PRN
Status: DISCONTINUED | OUTPATIENT
Start: 2024-11-05 | End: 2024-11-05 | Stop reason: HOSPADM

## 2024-11-05 RX ADMIN — PROPOFOL 50 MG: 10 INJECTION, EMULSION INTRAVENOUS at 09:36

## 2024-11-05 RX ADMIN — PROPOFOL 50 MG: 10 INJECTION, EMULSION INTRAVENOUS at 10:01

## 2024-11-05 RX ADMIN — PROPOFOL 50 MG: 10 INJECTION, EMULSION INTRAVENOUS at 09:41

## 2024-11-05 RX ADMIN — PROPOFOL 50 MG: 10 INJECTION, EMULSION INTRAVENOUS at 09:50

## 2024-11-05 RX ADMIN — PROPOFOL 50 MG: 10 INJECTION, EMULSION INTRAVENOUS at 09:56

## 2024-11-05 RX ADMIN — PROPOFOL 50 MG: 10 INJECTION, EMULSION INTRAVENOUS at 09:45

## 2024-11-05 RX ADMIN — PROPOFOL 50 MG: 10 INJECTION, EMULSION INTRAVENOUS at 09:37

## 2024-11-05 ASSESSMENT — PAIN - FUNCTIONAL ASSESSMENT
PAIN_FUNCTIONAL_ASSESSMENT: NONE - DENIES PAIN
PAIN_FUNCTIONAL_ASSESSMENT: 0-10

## 2024-11-05 NOTE — H&P
Patient Name: Kellee Bryant  : 1955  MRN: [id number]  DATE: 24      ENDOSCOPY  History and Physical    Procedure:    [] Diagnostic Colonoscopy       [x] Screening Colonoscopy  [] EGD      [] ERCP      [] EUS       [] Other    [x] Previous office notes/History and Physical reviewed from the patients chart. Please see EMR for further details of HPI. I have examined the patient's status immediately prior to the procedure and:      Indications/HPI:    []Abdominal Pain  []Cancer- GI/Lung  []Fhx of colon CA/polyps  []History of Polyps  []Butterfield’s   []Melena  []Abnormal Imaging  []Dysphagia    []Persistent Pneumonia  []Anemia  []Food Impaction  []History of Polyps  []GI Bleed  []Pulmonary nodule/Mass  []Change in bowel habits []Heartburn/Reflux  []Rectal Bleed (BRBPR)  []Chest Pain - Non Cardiac []Heme (+) Stoo  l[]Ulcers  []Constipation  []Hemoptysis   []Varices  []Diarrhea  []Hypoxemia  []Nausea/Vomiting  []Screening   []Crohns/Colitis  []Other:     Anesthesia:   [x] MAC [] Moderate Sedation   [] General   [] None     ROS: 12 pt Review of Symptoms was negative unless mentioned above    Medications:   Prior to Admission medications    Medication Sig Start Date End Date Taking? Authorizing Provider   atorvastatin (LIPITOR) 20 MG tablet Take 1 tablet by mouth daily 23  Yes ProviderAnt MD   metoprolol succinate (TOPROL XL) 50 MG extended release tablet Take 1 tablet by mouth daily 23  Yes ProviderAnt MD   verapamil (CALAN SR) 120 MG extended release tablet Take 2 tablets by mouth 2 times daily 23  Yes Provider, MD Ant   ergocalciferol (ERGOCALCIFEROL) 1.25 MG (21058 UT) capsule take 1 capsule once a week 22  Yes Ant Hunter MD   ascorbic acid (VITAMIN C) 500 MG tablet Take 1 tablet by mouth 2 times daily (with meals)  Patient not taking: Reported on 2024   Ant Hunter MD   losartan (COZAAR) 100 MG tablet Take 1 tablet by mouth

## 2024-11-05 NOTE — ANESTHESIA POSTPROCEDURE EVALUATION
Department of Anesthesiology  Postprocedure Note    Patient: Kellee Bryant  MRN: [id number]  YOB: 1955  Date of evaluation: 11/5/2024    Procedure Summary       Date: 11/05/24 Room / Location: [facility] OR 01 / [facility]    Anesthesia Start: 0934 Anesthesia Stop: 1004    Procedure: COLORECTAL CANCER SCREENING, NOT HIGH RISK WITH POLYPECTOMIES Diagnosis:       Colon cancer screening      (Colon cancer screening [Z12.11])    Surgeons: Adriana Hernandez MD Responsible Provider: Jo Garvin APRN - CRNA    Anesthesia Type: MAC ASA Status: 2            Anesthesia Type: No value filed.    Jasmeet Phase I: Jasmeet Score: 10    Jasmeet Phase II:      Anesthesia Post Evaluation    Patient location during evaluation: bedside  Patient participation: complete - patient participated  Level of consciousness: awake and awake and alert  Pain score: 0  Airway patency: patent  Nausea & Vomiting: no nausea and no vomiting  Cardiovascular status: blood pressure returned to baseline and hemodynamically stable  Respiratory status: acceptable  Hydration status: euvolemic  Pain management: adequate        No notable events documented.

## 2024-11-05 NOTE — ANESTHESIA PRE PROCEDURE
Department of Anesthesiology  Preprocedure Note       Name:  Kellee Bryant   Age:  69 y.o.  :  1955                                          MRN:  [id number]         Date:  2024      Surgeon: Surgeon(s):  Adriana Hernandez MD    Procedure: Procedure(s):  COLORECTAL CANCER SCREENING, NOT HIGH RISK    Medications prior to admission:   Prior to Admission medications    Medication Sig Start Date End Date Taking? Authorizing Provider   ascorbic acid (VITAMIN C) 500 MG tablet Take 1 tablet by mouth 2 times daily (with meals) 16   Ant Hunter MD   atorvastatin (LIPITOR) 20 MG tablet Take 1 tablet by mouth daily 23   Ant Hunter MD   losartan (COZAAR) 100 MG tablet Take 1 tablet by mouth daily 23   Ant Hunter MD   magnesium Oxide 500 MG TABS Take 500 mg by mouth daily 22   Ant Hunter MD   meloxicam (MOBIC) 15 MG tablet Take 1 tablet by mouth daily with food 23   Ant Hunter MD   metoprolol succinate (TOPROL XL) 50 MG extended release tablet Take 1 tablet by mouth daily 23   Ant Hunter MD   potassium chloride (KLOR-CON M) 10 MEQ extended release tablet Take 2 tablets by mouth daily 23   Ant Hunter MD   verapamil (CALAN SR) 120 MG extended release tablet Take 2 tablets by mouth 2 times daily 23   Ant Hunter MD   ergocalciferol (ERGOCALCIFEROL) 1.25 MG (15792 UT) capsule take 1 capsule once a week 22   Ant Hunter MD   cyanocobalamin 1000 MCG tablet Take 1 tablet by mouth daily    Ant Hunter MD   chlorthalidone (HYGROTON) 25 MG tablet Take 1 tablet by mouth daily 3/17/23   Ant Hunter MD       Current medications:    Current Facility-Administered Medications   Medication Dose Route Frequency Provider Last Rate Last Admin    0.9 % sodium chloride infusion   IntraVENous Continuous Adriana Hernandez MD        sodium chloride flush 0.9 % injection 5-40 mL

## 2024-12-05 PROBLEM — Z12.11 COLON CANCER SCREENING: Status: RESOLVED | Noted: 2024-11-05 | Resolved: 2024-12-05

## (undated) DEVICE — ENDO CARRY-ON PROCEDURE KIT: Brand: ENDO CARRY-ON PROCEDURE KIT

## (undated) DEVICE — SINGLE PORT MANIFOLD: Brand: NEPTUNE 2

## (undated) DEVICE — TUBING, SUCTION, 1/4" X 10', STRAIGHT: Brand: MEDLINE

## (undated) DEVICE — PLATE ES AD W 9FT CRD 2

## (undated) DEVICE — SNARE ENDOSCP AD L240CM LOOP W10MM SHTH DIA2.4MM RND INSUL

## (undated) DEVICE — GLOVE ORANGE PI 8 1/2   MSG9085

## (undated) DEVICE — TUBING IRRIGATION 140/160/180/190 SER GI ENDOSCP SMARTCAP

## (undated) DEVICE — BRUSH ENDO CLN L90.5IN SHTH DIA1.7MM BRIST DIA5-7MM 2-6MM

## (undated) DEVICE — TRAP POLYP BALEEN

## (undated) DEVICE — TUBE SET 96 MM 64 MM H2O PERISTALTIC STD AUX CHANNEL